# Patient Record
Sex: FEMALE | Race: WHITE | NOT HISPANIC OR LATINO | Employment: FULL TIME | URBAN - METROPOLITAN AREA
[De-identification: names, ages, dates, MRNs, and addresses within clinical notes are randomized per-mention and may not be internally consistent; named-entity substitution may affect disease eponyms.]

---

## 2021-01-04 ENCOUNTER — IMMUNIZATIONS (OUTPATIENT)
Dept: FAMILY MEDICINE CLINIC | Facility: HOSPITAL | Age: 61
End: 2021-01-04

## 2021-01-04 DIAGNOSIS — Z23 ENCOUNTER FOR IMMUNIZATION: ICD-10-CM

## 2021-01-04 PROCEDURE — 0011A SARS-COV-2 / COVID-19 MRNA VACCINE (MODERNA) 100 MCG: CPT

## 2021-01-04 PROCEDURE — 91301 SARS-COV-2 / COVID-19 MRNA VACCINE (MODERNA) 100 MCG: CPT

## 2021-02-03 ENCOUNTER — IMMUNIZATIONS (OUTPATIENT)
Dept: FAMILY MEDICINE CLINIC | Facility: HOSPITAL | Age: 61
End: 2021-02-03

## 2021-02-03 DIAGNOSIS — Z23 ENCOUNTER FOR IMMUNIZATION: Primary | ICD-10-CM

## 2021-02-03 PROCEDURE — 0012A SARS-COV-2 / COVID-19 MRNA VACCINE (MODERNA) 100 MCG: CPT

## 2021-02-03 PROCEDURE — 91301 SARS-COV-2 / COVID-19 MRNA VACCINE (MODERNA) 100 MCG: CPT

## 2021-04-12 ENCOUNTER — CONSULT (OUTPATIENT)
Dept: SURGERY | Facility: CLINIC | Age: 61
End: 2021-04-12
Payer: COMMERCIAL

## 2021-04-12 VITALS
HEIGHT: 67 IN | TEMPERATURE: 97.9 F | BODY MASS INDEX: 19.15 KG/M2 | SYSTOLIC BLOOD PRESSURE: 118 MMHG | DIASTOLIC BLOOD PRESSURE: 68 MMHG | HEART RATE: 79 BPM | WEIGHT: 122 LBS

## 2021-04-12 DIAGNOSIS — R10.11 RIGHT UPPER QUADRANT PAIN: Primary | ICD-10-CM

## 2021-04-12 PROCEDURE — 99203 OFFICE O/P NEW LOW 30 MIN: CPT | Performed by: SURGERY

## 2021-04-12 RX ORDER — MULTIVIT WITH MINERALS/LUTEIN
1000 TABLET ORAL DAILY
COMMUNITY

## 2021-04-12 NOTE — PROGRESS NOTES
[facility] History and Physical Note:    Assessment:   right upper quadrant pain  Plan: Will order gallbladder ultrasound and routine labs  See back in the clinic after these studies  Chief Complaint:  "I think I have  pain from my gallbladder"    HPI   patient is a very pleasant 78-year-old woman who made appointment to my office to be evaluated for her right upper quadrant pain  She reports epigastric and right upper quadrant pain intermittently for a little over 1 week  Pain is crampy, made worse with certain foods, located in the epigastric and right upper quadrant area, associated with nausea, and resolve spontaneously  Patient denies any vomiting or abnormal bowel movements  Denies any dark urine or light-colored stools  Her past medical history is unremarkable and she has no abdominal surgeries  PMH:  Past Medical History:   Diagnosis Date    Breast lump        PSH:  Past Surgical History:   Procedure Laterality Date    BREAST SURGERY         Home Meds:  Current Outpatient Medications on File Prior to Visit   Medication Sig Dispense Refill    Ascorbic Acid (vitamin C) 1000 MG tablet Take 1,000 mg by mouth daily       No current facility-administered medications on file prior to visit          Allergies:  No Known Allergies    Social Hx:  Social History     Socioeconomic History    Marital status: /Civil Union     Spouse name: Not on file    Number of children: Not on file    Years of education: Not on file    Highest education level: Not on file   Occupational History    Not on file   Social Needs    Financial resource strain: Not on file    Food insecurity     Worry: Not on file     Inability: Not on file   [employer] needs     Medical: Not on file     Non-medical: Not on file   Tobacco Use    Smoking status: Current Every Day Smoker     Types: Cigarettes    Smokeless tobacco: Never Used   Substance and Sexual Activity    Alcohol use: Yes     Comment: Socially    Drug use: Never    Sexual activity: Not on file   Lifestyle    Physical activity     Days per week: Not on file     Minutes per session: Not on file    Stress: Not on file   Relationships    Social connections     Talks on phone: Not on file     Gets together: Not on file     Attends Shinto service: Not on file     Active member of club or organization: Not on file     Attends meetings of clubs or organizations: Not on file     Relationship status: Not on file    Intimate partner violence     Fear of current or ex partner: Not on file     Emotionally abused: Not on file     Physically abused: Not on file     Forced sexual activity: Not on file   Other Topics Concern    Not on file   Social History Narrative    Not on file        Family Hx:    Family History   Problem Relation Age of Onset    Cancer Father          Review of Systems   Constitutional: Negative  HENT: Negative  Respiratory: Negative  Cardiovascular: Negative  Gastrointestinal:          See HPI   Genitourinary: Negative  Musculoskeletal: Negative  Allergic/Immunologic: Negative  Neurological: Negative  Hematological: Negative  /68 (BP Location: Right arm, Patient Position: Sitting, Cuff Size: Standard)   Pulse 79   Temp 97 9 °F (36 6 °C) (Core)   Ht 5' 7" (1 702 m)   Wt 55 3 kg (122 lb)   BMI 19 11 kg/m²     Physical Exam  Vitals signs reviewed  Constitutional:       General: She is not in acute distress  Appearance: Normal appearance  HENT:      Head: Normocephalic and atraumatic  Mouth/Throat:      Pharynx: Oropharynx is clear  Eyes:      Pupils: Pupils are equal, round, and reactive to light  Neck:      Musculoskeletal: Normal range of motion and neck supple  Cardiovascular:      Rate and Rhythm: Normal rate and regular rhythm  Heart sounds: No murmur  Pulmonary:      Effort: Pulmonary effort is normal  No respiratory distress  Breath sounds: Normal breath sounds  Abdominal:      General: Abdomen is flat  There is no distension  Palpations: Abdomen is soft  Comments:  Mild epigastric and right upper quadrant tenderness to deep palpation  No peritonitis   Musculoskeletal: Normal range of motion  Lymphadenopathy:      Cervical: No cervical adenopathy  Skin:     General: Skin is warm and dry  Capillary Refill: Capillary refill takes less than 2 seconds  Neurological:      General: No focal deficit present  Mental Status: She is alert and oriented to person, place, and time     Psychiatric:         Mood and Affect: Mood normal          Behavior: Behavior normal          Pertinent labs reviewed    Pertinent images and available reads personally reviewed    Pertinent notes reviewed       Christiano Schroeder MD [id number] [facility]  [phone]

## 2021-04-16 ENCOUNTER — HOSPITAL ENCOUNTER (OUTPATIENT)
Dept: RADIOLOGY | Facility: HOSPITAL | Age: 61
Discharge: HOME | End: 2021-04-16
Attending: SURGERY
Payer: COMMERCIAL

## 2021-04-16 ENCOUNTER — TRANSCRIBE ORDERS (OUTPATIENT)
Dept: ADMINISTRATIVE | Facility: HOSPITAL | Age: 61
End: 2021-04-16

## 2021-04-16 ENCOUNTER — APPOINTMENT (OUTPATIENT)
Dept: LAB | Facility: HOSPITAL | Age: 61
End: 2021-04-16
Attending: SURGERY
Payer: COMMERCIAL

## 2021-04-16 DIAGNOSIS — R10.11 RIGHT UPPER QUADRANT PAIN: ICD-10-CM

## 2021-04-16 LAB
ALBUMIN SERPL BCP-MCNC: 3.7 G/DL (ref 3.5–5)
ALP SERPL-CCNC: 67 U/L (ref 46–116)
ALT SERPL W P-5'-P-CCNC: 19 U/L (ref 12–78)
ANION GAP SERPL CALCULATED.3IONS-SCNC: 7 MMOL/L (ref 4–13)
AST SERPL W P-5'-P-CCNC: 10 U/L (ref 5–45)
BILIRUB SERPL-MCNC: 0.33 MG/DL (ref 0.2–1)
BUN SERPL-MCNC: 23 MG/DL (ref 5–25)
CALCIUM SERPL-MCNC: 8.6 MG/DL (ref 8.3–10.1)
CHLORIDE SERPL-SCNC: 107 MMOL/L (ref 100–108)
CO2 SERPL-SCNC: 28 MMOL/L (ref 21–32)
CREAT SERPL-MCNC: 0.64 MG/DL (ref 0.6–1.3)
ERYTHROCYTE [DISTWIDTH] IN BLOOD BY AUTOMATED COUNT: 13.1 % (ref 11.6–15.1)
GFR SERPL CREATININE-BSD FRML MDRD: 97 ML/MIN/1.73SQ M
GLUCOSE P FAST SERPL-MCNC: 81 MG/DL (ref 65–99)
HCT VFR BLD AUTO: 39.4 % (ref 34.8–46.1)
HGB BLD-MCNC: 12.3 G/DL (ref 11.5–15.4)
MCH RBC QN AUTO: 30.4 PG (ref 26.8–34.3)
MCHC RBC AUTO-ENTMCNC: 31.2 G/DL (ref 31.4–37.4)
MCV RBC AUTO: 98 FL (ref 82–98)
PLATELET # BLD AUTO: 311 THOUSANDS/UL (ref 149–390)
PMV BLD AUTO: 8.9 FL (ref 8.9–12.7)
POTASSIUM SERPL-SCNC: 4 MMOL/L (ref 3.5–5.3)
PROT SERPL-MCNC: 6.3 G/DL (ref 6.4–8.2)
RBC # BLD AUTO: 4.04 MILLION/UL (ref 3.81–5.12)
SODIUM SERPL-SCNC: 142 MMOL/L (ref 136–145)
WBC # BLD AUTO: 7.98 THOUSAND/UL (ref 4.31–10.16)

## 2021-04-16 PROCEDURE — 80053 COMPREHEN METABOLIC PANEL: CPT

## 2021-04-16 PROCEDURE — 36415 COLL VENOUS BLD VENIPUNCTURE: CPT

## 2021-04-16 PROCEDURE — 76705 ECHO EXAM OF ABDOMEN: CPT

## 2021-04-16 PROCEDURE — 85027 COMPLETE CBC AUTOMATED: CPT

## 2021-04-19 ENCOUNTER — OFFICE VISIT (OUTPATIENT)
Dept: SURGERY | Facility: CLINIC | Age: 61
End: 2021-04-19
Payer: COMMERCIAL

## 2021-04-19 VITALS
BODY MASS INDEX: 19.46 KG/M2 | HEART RATE: 72 BPM | SYSTOLIC BLOOD PRESSURE: 114 MMHG | TEMPERATURE: 97.9 F | HEIGHT: 67 IN | DIASTOLIC BLOOD PRESSURE: 74 MMHG | WEIGHT: 124 LBS

## 2021-04-19 DIAGNOSIS — Z01.818 PREOPERATIVE EXAMINATION: ICD-10-CM

## 2021-04-19 DIAGNOSIS — K80.20 SYMPTOMATIC CHOLELITHIASIS: Primary | ICD-10-CM

## 2021-04-19 PROCEDURE — 99213 OFFICE O/P EST LOW 20 MIN: CPT | Performed by: SURGERY

## 2021-04-19 PROCEDURE — 3008F BODY MASS INDEX DOCD: CPT | Performed by: SURGERY

## 2021-04-19 RX ORDER — CEFAZOLIN SODIUM 1 G/50ML
1000 SOLUTION INTRAVENOUS ONCE
Status: CANCELLED | OUTPATIENT
Start: 2021-04-28 | End: 2021-04-19

## 2021-04-19 NOTE — H&P (VIEW-ONLY)
[address] [place] [address] [facility] History and Physical Note:    Assessment:  Gallbladder sludge  Given her classic symptoms for gallbladder pathology, I have offered her laparoscopic cholecystectomy  I explained that some or all of her right upper quadrant symptoms could persist after surgery  She understands, desires to proceed  Plan:  Laparoscopic cholecystectomy  Chief Complaint:  "I am back to follow-up on the tests"    HPI  patient is a very pleasant 78-year-old woman who returns to my office for continued evaluation of her right upper quadrant pain        She reports epigastric and right upper quadrant pain intermittently for a little over 1 week prior to her last visit  Pain is crampy, made worse with certain foods, located in the epigastric and right upper quadrant area, associated with nausea, and resolve spontaneously  Patient denies any vomiting or abnormal bowel movements  Denies any dark urine or light-colored stools        Her past medical history is unremarkable and she has no abdominal surgeries  I ordered some routine laboratory work as well as a right upper quadrant ultrasound  CMP and CBC are unremarkable  Gallbladder ultrasound:   IMPRESSION:  Trace sludge in the gallbladder  No sonographic evidence for acute cholecystitis  PMH:  Past Medical History:   Diagnosis Date    Breast lump        PSH:  Past Surgical History:   Procedure Laterality Date    BREAST SURGERY         Home Meds:  Current Outpatient Medications on File Prior to Visit   Medication Sig Dispense Refill    Ascorbic Acid (vitamin C) 1000 MG tablet Take 1,000 mg by mouth daily       No current facility-administered medications on file prior to visit          Allergies:  No Known Allergies    Social Hx:  Social History     Socioeconomic History    Marital status: /Civil Union     Spouse name: Not on file    Number of children: Not on file    Years of education: Not on file   [place] Highest education level: Not on file   Occupational History    Not on file   Social Needs    Financial resource strain: Not on file    Food insecurity     Worry: Not on file     Inability: Not on file    Transportation needs     Medical: Not on file     Non-medical: Not on file   Tobacco Use    Smoking status: Current Every Day Smoker     Types: Cigarettes    Smokeless tobacco: Never Used   Substance and Sexual Activity    Alcohol use: Yes     Comment: Socially    Drug use: Never    Sexual activity: Not on file   Lifestyle    Physical activity     Days per week: Not on file     Minutes per session: Not on file    Stress: Not on file   Relationships    Social connections     Talks on phone: Not on file     Gets together: Not on file     Attends Pentecostalism service: Not on file     Active member of club or organization: Not on file     Attends meetings of clubs or organizations: Not on file     Relationship status: Not on file    Intimate partner violence     Fear of current or ex partner: Not on file     Emotionally abused: Not on file     Physically abused: Not on file     Forced sexual activity: Not on file   Other Topics Concern    Not on file   Social History Narrative    Not on file        Family Hx:    Family History   Problem Relation Age of Onset    Cancer Father          Review of Systems   Constitutional: Negative  HENT: Negative  Respiratory: Negative  Cardiovascular: Negative  Gastrointestinal: Negative  Genitourinary: Negative  Musculoskeletal: Negative  Skin: Negative  Allergic/Immunologic: Negative  Neurological: Negative  Hematological: Negative  Psychiatric/Behavioral: Negative  There were no vitals taken for this visit  Physical Exam  Constitutional:       General: She is not in acute distress  Appearance: Normal appearance  HENT:      Head: Normocephalic and atraumatic  Mouth/Throat:      Pharynx: Oropharynx is clear     Eyes: Pupils: Pupils are equal, round, and reactive to light  Neck:      Musculoskeletal: Normal range of motion and neck supple  Cardiovascular:      Rate and Rhythm: Normal rate and regular rhythm  Heart sounds: No murmur  Pulmonary:      Effort: No respiratory distress  Breath sounds: Normal breath sounds  Abdominal:      General: There is no distension  Palpations: Abdomen is soft  There is no mass  Comments:  Mild discomfort in the right upper quadrant to deep palpation  Musculoskeletal: Normal range of motion  Lymphadenopathy:      Cervical: No cervical adenopathy  Skin:     General: Skin is warm and dry  Capillary Refill: Capillary refill takes less than 2 seconds  Neurological:      General: No focal deficit present  Mental Status: She is alert and oriented to person, place, and time  Pertinent labs reviewed  I personally reviewed her CBC and CMP    Pertinent images and available reads personally reviewed   I personally reviewed a gallbladder ultrasound as well as the report and reviewed this with the patient      Pertinent notes reviewed       Lei Lopez MD [address] [facility]  [phone]

## 2021-04-19 NOTE — PROGRESS NOTES
[address] [place] [address] [facility] History and Physical Note:    Assessment:  Gallbladder sludge  Given her classic symptoms for gallbladder pathology, I have offered her laparoscopic cholecystectomy  I explained that some or all of her right upper quadrant symptoms could persist after surgery  She understands, desires to proceed  Plan:  Laparoscopic cholecystectomy  Chief Complaint:  "I am back to follow-up on the tests"    HPI  patient is a very pleasant 58-year-old woman who returns to my office for continued evaluation of her right upper quadrant pain        She reports epigastric and right upper quadrant pain intermittently for a little over 1 week prior to her last visit  Pain is crampy, made worse with certain foods, located in the epigastric and right upper quadrant area, associated with nausea, and resolve spontaneously  Patient denies any vomiting or abnormal bowel movements  Denies any dark urine or light-colored stools        Her past medical history is unremarkable and she has no abdominal surgeries  I ordered some routine laboratory work as well as a right upper quadrant ultrasound  CMP and CBC are unremarkable  Gallbladder ultrasound:   IMPRESSION:  Trace sludge in the gallbladder  No sonographic evidence for acute cholecystitis  PMH:  Past Medical History:   Diagnosis Date    Breast lump        PSH:  Past Surgical History:   Procedure Laterality Date    BREAST SURGERY         Home Meds:  Current Outpatient Medications on File Prior to Visit   Medication Sig Dispense Refill    Ascorbic Acid (vitamin C) 1000 MG tablet Take 1,000 mg by mouth daily       No current facility-administered medications on file prior to visit          Allergies:  No Known Allergies    Social Hx:  Social History     Socioeconomic History    Marital status: /Civil Union     Spouse name: Not on file    Number of children: Not on file    Years of education: Not on file   Destiney Navas Highest education level: Not on file   Occupational History    Not on file   Social Needs    Financial resource strain: Not on file    Food insecurity     Worry: Not on file     Inability: Not on file    Transportation needs     Medical: Not on file     Non-medical: Not on file   Tobacco Use    Smoking status: Current Every Day Smoker     Types: Cigarettes    Smokeless tobacco: Never Used   Substance and Sexual Activity    Alcohol use: Yes     Comment: Socially    Drug use: Never    Sexual activity: Not on file   Lifestyle    Physical activity     Days per week: Not on file     Minutes per session: Not on file    Stress: Not on file   Relationships    Social connections     Talks on phone: Not on file     Gets together: Not on file     Attends Restorationist service: Not on file     Active member of club or organization: Not on file     Attends meetings of clubs or organizations: Not on file     Relationship status: Not on file    Intimate partner violence     Fear of current or ex partner: Not on file     Emotionally abused: Not on file     Physically abused: Not on file     Forced sexual activity: Not on file   Other Topics Concern    Not on file   Social History Narrative    Not on file        Family Hx:    Family History   Problem Relation Age of Onset    Cancer Father          Review of Systems   Constitutional: Negative  HENT: Negative  Respiratory: Negative  Cardiovascular: Negative  Gastrointestinal: Negative  Genitourinary: Negative  Musculoskeletal: Negative  Skin: Negative  Allergic/Immunologic: Negative  Neurological: Negative  Hematological: Negative  Psychiatric/Behavioral: Negative  There were no vitals taken for this visit  Physical Exam  Constitutional:       General: She is not in acute distress  Appearance: Normal appearance  HENT:      Head: Normocephalic and atraumatic  Mouth/Throat:      Pharynx: Oropharynx is clear     Eyes: Pupils: Pupils are equal, round, and reactive to light  Neck:      Musculoskeletal: Normal range of motion and neck supple  Cardiovascular:      Rate and Rhythm: Normal rate and regular rhythm  Heart sounds: No murmur  Pulmonary:      Effort: No respiratory distress  Breath sounds: Normal breath sounds  Abdominal:      General: There is no distension  Palpations: Abdomen is soft  There is no mass  Comments:  Mild discomfort in the right upper quadrant to deep palpation  Musculoskeletal: Normal range of motion  Lymphadenopathy:      Cervical: No cervical adenopathy  Skin:     General: Skin is warm and dry  Capillary Refill: Capillary refill takes less than 2 seconds  Neurological:      General: No focal deficit present  Mental Status: She is alert and oriented to person, place, and time  Pertinent labs reviewed  I personally reviewed her CBC and CMP    Pertinent images and available reads personally reviewed   I personally reviewed a gallbladder ultrasound as well as the report and reviewed this with the patient      Pertinent notes reviewed       Cinthya Figueroa MD [address] [facility]  [phone]

## 2021-04-19 NOTE — H&P
[address] [place] [address] [facility] History and Physical Note:    Assessment:  Gallbladder sludge  Given her classic symptoms for gallbladder pathology, I have offered her laparoscopic cholecystectomy  I explained that some or all of her right upper quadrant symptoms could persist after surgery  She understands, desires to proceed  Plan:  Laparoscopic cholecystectomy  Chief Complaint:  "I am back to follow-up on the tests"    HPI  patient is a very pleasant 69-year-old woman who returns to my office for continued evaluation of her right upper quadrant pain        She reports epigastric and right upper quadrant pain intermittently for a little over 1 week prior to her last visit  Pain is crampy, made worse with certain foods, located in the epigastric and right upper quadrant area, associated with nausea, and resolve spontaneously  Patient denies any vomiting or abnormal bowel movements  Denies any dark urine or light-colored stools        Her past medical history is unremarkable and she has no abdominal surgeries  I ordered some routine laboratory work as well as a right upper quadrant ultrasound  CMP and CBC are unremarkable  Gallbladder ultrasound:   IMPRESSION:  Trace sludge in the gallbladder  No sonographic evidence for acute cholecystitis  PMH:  Past Medical History:   Diagnosis Date    Breast lump        PSH:  Past Surgical History:   Procedure Laterality Date    BREAST SURGERY         Home Meds:  Current Outpatient Medications on File Prior to Visit   Medication Sig Dispense Refill    Ascorbic Acid (vitamin C) 1000 MG tablet Take 1,000 mg by mouth daily       No current facility-administered medications on file prior to visit          Allergies:  No Known Allergies    Social Hx:  Social History     Socioeconomic History    Marital status: /Civil Union     Spouse name: Not on file    Number of children: Not on file    Years of education: Not on file   [place] Highest education level: Not on file   Occupational History    Not on file   Social Needs    Financial resource strain: Not on file    Food insecurity     Worry: Not on file     Inability: Not on file    Transportation needs     Medical: Not on file     Non-medical: Not on file   Tobacco Use    Smoking status: Current Every Day Smoker     Types: Cigarettes    Smokeless tobacco: Never Used   Substance and Sexual Activity    Alcohol use: Yes     Comment: Socially    Drug use: Never    Sexual activity: Not on file   Lifestyle    Physical activity     Days per week: Not on file     Minutes per session: Not on file    Stress: Not on file   Relationships    Social connections     Talks on phone: Not on file     Gets together: Not on file     Attends Lutheran service: Not on file     Active member of club or organization: Not on file     Attends meetings of clubs or organizations: Not on file     Relationship status: Not on file    Intimate partner violence     Fear of current or ex partner: Not on file     Emotionally abused: Not on file     Physically abused: Not on file     Forced sexual activity: Not on file   Other Topics Concern    Not on file   Social History Narrative    Not on file        Family Hx:    Family History   Problem Relation Age of Onset    Cancer Father          Review of Systems   Constitutional: Negative  HENT: Negative  Respiratory: Negative  Cardiovascular: Negative  Gastrointestinal: Negative  Genitourinary: Negative  Musculoskeletal: Negative  Skin: Negative  Allergic/Immunologic: Negative  Neurological: Negative  Hematological: Negative  Psychiatric/Behavioral: Negative  There were no vitals taken for this visit  Physical Exam  Constitutional:       General: She is not in acute distress  Appearance: Normal appearance  HENT:      Head: Normocephalic and atraumatic  Mouth/Throat:      Pharynx: Oropharynx is clear     Eyes: Pupils: Pupils are equal, round, and reactive to light  Neck:      Musculoskeletal: Normal range of motion and neck supple  Cardiovascular:      Rate and Rhythm: Normal rate and regular rhythm  Heart sounds: No murmur  Pulmonary:      Effort: No respiratory distress  Breath sounds: Normal breath sounds  Abdominal:      General: There is no distension  Palpations: Abdomen is soft  There is no mass  Comments:  Mild discomfort in the right upper quadrant to deep palpation  Musculoskeletal: Normal range of motion  Lymphadenopathy:      Cervical: No cervical adenopathy  Skin:     General: Skin is warm and dry  Capillary Refill: Capillary refill takes less than 2 seconds  Neurological:      General: No focal deficit present  Mental Status: She is alert and oriented to person, place, and time  Pertinent labs reviewed  I personally reviewed her CBC and CMP    Pertinent images and available reads personally reviewed   I personally reviewed a gallbladder ultrasound as well as the report and reviewed this with the patient      Pertinent notes reviewed       Ivy Brandon MD [id number] [facility]  [phone]

## 2021-04-22 DIAGNOSIS — Z01.818 PREOPERATIVE EXAMINATION: ICD-10-CM

## 2021-04-22 PROCEDURE — U0005 INFEC AGEN DETEC AMPLI PROBE: HCPCS | Performed by: SURGERY

## 2021-04-22 PROCEDURE — U0003 INFECTIOUS AGENT DETECTION BY NUCLEIC ACID (DNA OR RNA); SEVERE ACUTE RESPIRATORY SYNDROME CORONAVIRUS 2 (SARS-COV-2) (CORONAVIRUS DISEASE [COVID-19]), AMPLIFIED PROBE TECHNIQUE, MAKING USE OF HIGH THROUGHPUT TECHNOLOGIES AS DESCRIBED BY CMS-2020-01-R: HCPCS | Performed by: SURGERY

## 2021-04-23 ENCOUNTER — TRANSCRIBE ORDERS (OUTPATIENT)
Dept: ADMINISTRATIVE | Facility: HOSPITAL | Age: 61
End: 2021-04-23

## 2021-04-23 ENCOUNTER — OFFICE VISIT (OUTPATIENT)
Dept: LAB | Facility: HOSPITAL | Age: 61
End: 2021-04-23
Attending: SURGERY
Payer: COMMERCIAL

## 2021-04-23 DIAGNOSIS — Z01.818 PREOPERATIVE EXAMINATION: ICD-10-CM

## 2021-04-23 LAB — SARS-COV-2 RNA RESP QL NAA+PROBE: NEGATIVE

## 2021-04-23 PROCEDURE — 93005 ELECTROCARDIOGRAM TRACING: CPT

## 2021-04-24 LAB
ATRIAL RATE: 60 BPM
P AXIS: 60 DEGREES
PR INTERVAL: 134 MS
QRS AXIS: -7 DEGREES
QRSD INTERVAL: 92 MS
QT INTERVAL: 382 MS
QTC INTERVAL: 382 MS
T WAVE AXIS: 6 DEGREES
VENTRICULAR RATE: 60 BPM

## 2021-04-24 PROCEDURE — 93010 ELECTROCARDIOGRAM REPORT: CPT | Performed by: INTERNAL MEDICINE

## 2021-04-27 ENCOUNTER — ANESTHESIA EVENT (OUTPATIENT)
Dept: PERIOP | Facility: HOSPITAL | Age: 61
End: 2021-04-27
Payer: COMMERCIAL

## 2021-04-27 PROBLEM — F17.200 SMOKING: Status: ACTIVE | Noted: 2021-04-27

## 2021-04-27 PROBLEM — IMO0001 SMOKING: Status: ACTIVE | Noted: 2021-04-27

## 2021-04-27 NOTE — ANESTHESIA PREPROCEDURE EVALUATION
Procedure:  CHOLECYSTECTOMY LAPAROSCOPIC (N/A Abdomen)    Relevant Problems   PULMONARY   (+) Smoking        Physical Exam    Airway    Mallampati score: II  TM Distance: >3 FB  Neck ROM: full     Dental       Cardiovascular  Rhythm: regular, Rate: normal,     Pulmonary  Breath sounds clear to auscultation,     Other Findings  Upper lip lesion from skin cancer resection      Anesthesia Plan  ASA Score- 2     Anesthesia Type- general with ASA Monitors  Additional Monitors:   Airway Plan: ETT  Plan Factors-    Chart reviewed  Patient is a current smoker  Induction- intravenous  Postoperative Plan- Plan for postoperative opioid use  Informed Consent- Anesthetic plan and risks discussed with patient  I personally reviewed this patient with the CRNA  Discussed and agreed on the Anesthesia Plan with the CRNA  Harvey Brizuela

## 2021-04-28 ENCOUNTER — ANESTHESIA (OUTPATIENT)
Dept: PERIOP | Facility: HOSPITAL | Age: 61
End: 2021-04-28
Payer: COMMERCIAL

## 2021-04-28 ENCOUNTER — HOSPITAL ENCOUNTER (OUTPATIENT)
Facility: HOSPITAL | Age: 61
Setting detail: OUTPATIENT SURGERY
Discharge: HOME | End: 2021-04-28
Attending: SURGERY | Admitting: SURGERY
Payer: COMMERCIAL

## 2021-04-28 VITALS
BODY MASS INDEX: 19.37 KG/M2 | DIASTOLIC BLOOD PRESSURE: 81 MMHG | OXYGEN SATURATION: 98 % | TEMPERATURE: 97.3 F | HEART RATE: 86 BPM | HEIGHT: 67 IN | WEIGHT: 123.4 LBS | SYSTOLIC BLOOD PRESSURE: 126 MMHG | RESPIRATION RATE: 16 BRPM

## 2021-04-28 DIAGNOSIS — G89.18 POSTOPERATIVE PAIN: Primary | ICD-10-CM

## 2021-04-28 DIAGNOSIS — K80.20 SYMPTOMATIC CHOLELITHIASIS: ICD-10-CM

## 2021-04-28 PROCEDURE — 88304 TISSUE EXAM BY PATHOLOGIST: CPT | Performed by: PATHOLOGY

## 2021-04-28 PROCEDURE — 47562 LAPAROSCOPIC CHOLECYSTECTOMY: CPT | Performed by: PHYSICIAN ASSISTANT

## 2021-04-28 PROCEDURE — 47562 LAPAROSCOPIC CHOLECYSTECTOMY: CPT | Performed by: SURGERY

## 2021-04-28 RX ORDER — PROPOFOL 10 MG/ML
INJECTION, EMULSION INTRAVENOUS AS NEEDED
Status: DISCONTINUED | OUTPATIENT
Start: 2021-04-28 | End: 2021-04-28

## 2021-04-28 RX ORDER — FENTANYL CITRATE/PF 50 MCG/ML
25 SYRINGE (ML) INJECTION
Status: COMPLETED | OUTPATIENT
Start: 2021-04-28 | End: 2021-04-28

## 2021-04-28 RX ORDER — MAGNESIUM HYDROXIDE 1200 MG/15ML
LIQUID ORAL AS NEEDED
Status: DISCONTINUED | OUTPATIENT
Start: 2021-04-28 | End: 2021-04-28 | Stop reason: HOSPADM

## 2021-04-28 RX ORDER — GLYCOPYRROLATE 0.2 MG/ML
INJECTION INTRAMUSCULAR; INTRAVENOUS AS NEEDED
Status: DISCONTINUED | OUTPATIENT
Start: 2021-04-28 | End: 2021-04-28

## 2021-04-28 RX ORDER — ROCURONIUM BROMIDE 10 MG/ML
INJECTION, SOLUTION INTRAVENOUS AS NEEDED
Status: DISCONTINUED | OUTPATIENT
Start: 2021-04-28 | End: 2021-04-28

## 2021-04-28 RX ORDER — OXYCODONE HYDROCHLORIDE AND ACETAMINOPHEN 5; 325 MG/1; MG/1
1 TABLET ORAL EVERY 4 HOURS PRN
Qty: 8 TABLET | Refills: 0 | Status: SHIPPED | OUTPATIENT
Start: 2021-04-28

## 2021-04-28 RX ORDER — CEFAZOLIN SODIUM 1 G/50ML
SOLUTION INTRAVENOUS AS NEEDED
Status: DISCONTINUED | OUTPATIENT
Start: 2021-04-28 | End: 2021-04-28

## 2021-04-28 RX ORDER — BUPIVACAINE HYDROCHLORIDE AND EPINEPHRINE 5; 5 MG/ML; UG/ML
INJECTION, SOLUTION EPIDURAL; INTRACAUDAL; PERINEURAL AS NEEDED
Status: DISCONTINUED | OUTPATIENT
Start: 2021-04-28 | End: 2021-04-28 | Stop reason: HOSPADM

## 2021-04-28 RX ORDER — ONDANSETRON 2 MG/ML
INJECTION INTRAMUSCULAR; INTRAVENOUS AS NEEDED
Status: DISCONTINUED | OUTPATIENT
Start: 2021-04-28 | End: 2021-04-28

## 2021-04-28 RX ORDER — SODIUM CHLORIDE, SODIUM LACTATE, POTASSIUM CHLORIDE, CALCIUM CHLORIDE 600; 310; 30; 20 MG/100ML; MG/100ML; MG/100ML; MG/100ML
20 INJECTION, SOLUTION INTRAVENOUS CONTINUOUS
Status: DISCONTINUED | OUTPATIENT
Start: 2021-04-28 | End: 2021-04-28 | Stop reason: HOSPADM

## 2021-04-28 RX ORDER — FENTANYL CITRATE 50 UG/ML
INJECTION, SOLUTION INTRAMUSCULAR; INTRAVENOUS AS NEEDED
Status: DISCONTINUED | OUTPATIENT
Start: 2021-04-28 | End: 2021-04-28

## 2021-04-28 RX ORDER — CEFAZOLIN SODIUM 1 G/50ML
1000 SOLUTION INTRAVENOUS ONCE
Status: DISCONTINUED | OUTPATIENT
Start: 2021-04-28 | End: 2021-04-28 | Stop reason: HOSPADM

## 2021-04-28 RX ORDER — NEOSTIGMINE METHYLSULFATE 1 MG/ML
INJECTION INTRAVENOUS AS NEEDED
Status: DISCONTINUED | OUTPATIENT
Start: 2021-04-28 | End: 2021-04-28

## 2021-04-28 RX ORDER — METOCLOPRAMIDE HYDROCHLORIDE 5 MG/ML
10 INJECTION INTRAMUSCULAR; INTRAVENOUS ONCE AS NEEDED
Status: DISCONTINUED | OUTPATIENT
Start: 2021-04-28 | End: 2021-04-28 | Stop reason: HOSPADM

## 2021-04-28 RX ORDER — ONDANSETRON 2 MG/ML
4 INJECTION INTRAMUSCULAR; INTRAVENOUS ONCE AS NEEDED
Status: DISCONTINUED | OUTPATIENT
Start: 2021-04-28 | End: 2021-04-28 | Stop reason: HOSPADM

## 2021-04-28 RX ORDER — LIDOCAINE HYDROCHLORIDE 10 MG/ML
INJECTION, SOLUTION EPIDURAL; INFILTRATION; INTRACAUDAL; PERINEURAL AS NEEDED
Status: DISCONTINUED | OUTPATIENT
Start: 2021-04-28 | End: 2021-04-28

## 2021-04-28 RX ORDER — MIDAZOLAM HYDROCHLORIDE 2 MG/2ML
INJECTION, SOLUTION INTRAMUSCULAR; INTRAVENOUS AS NEEDED
Status: DISCONTINUED | OUTPATIENT
Start: 2021-04-28 | End: 2021-04-28

## 2021-04-28 RX ORDER — SODIUM CHLORIDE, SODIUM LACTATE, POTASSIUM CHLORIDE, CALCIUM CHLORIDE 600; 310; 30; 20 MG/100ML; MG/100ML; MG/100ML; MG/100ML
75 INJECTION, SOLUTION INTRAVENOUS CONTINUOUS
Status: DISCONTINUED | OUTPATIENT
Start: 2021-04-28 | End: 2021-04-28 | Stop reason: SDUPTHER

## 2021-04-28 RX ORDER — DEXAMETHASONE SODIUM PHOSPHATE 4 MG/ML
INJECTION, SOLUTION INTRA-ARTICULAR; INTRALESIONAL; INTRAMUSCULAR; INTRAVENOUS; SOFT TISSUE AS NEEDED
Status: DISCONTINUED | OUTPATIENT
Start: 2021-04-28 | End: 2021-04-28

## 2021-04-28 RX ADMIN — ONDANSETRON 4 MG: 2 INJECTION INTRAMUSCULAR; INTRAVENOUS at 09:55

## 2021-04-28 RX ADMIN — FENTANYL CITRATE 25 MCG: 50 INJECTION, SOLUTION INTRAMUSCULAR; INTRAVENOUS at 11:22

## 2021-04-28 RX ADMIN — PROPOFOL 200 MG: 10 INJECTION, EMULSION INTRAVENOUS at 09:43

## 2021-04-28 RX ADMIN — NEOSTIGMINE METHYLSULFATE 3 MG: 1 INJECTION INTRAVENOUS at 10:21

## 2021-04-28 RX ADMIN — LIDOCAINE HYDROCHLORIDE 50 MG: 10 INJECTION, SOLUTION EPIDURAL; INFILTRATION; INTRACAUDAL; PERINEURAL at 09:43

## 2021-04-28 RX ADMIN — GLYCOPYRROLATE 0.4 MG: 0.2 INJECTION, SOLUTION INTRAMUSCULAR; INTRAVENOUS at 10:21

## 2021-04-28 RX ADMIN — FENTANYL CITRATE 50 MCG: 50 INJECTION, SOLUTION INTRAMUSCULAR; INTRAVENOUS at 10:07

## 2021-04-28 RX ADMIN — MIDAZOLAM 2 MG: 1 INJECTION INTRAMUSCULAR; INTRAVENOUS at 09:36

## 2021-04-28 RX ADMIN — CEFAZOLIN SODIUM 1000 MG: 1 SOLUTION INTRAVENOUS at 09:33

## 2021-04-28 RX ADMIN — FENTANYL CITRATE 50 MCG: 50 INJECTION, SOLUTION INTRAMUSCULAR; INTRAVENOUS at 09:43

## 2021-04-28 RX ADMIN — SODIUM CHLORIDE, SODIUM LACTATE, POTASSIUM CHLORIDE, AND CALCIUM CHLORIDE 75 ML/HR: .6; .31; .03; .02 INJECTION, SOLUTION INTRAVENOUS at 07:30

## 2021-04-28 RX ADMIN — ROCURONIUM BROMIDE 40 MG: 10 INJECTION, SOLUTION INTRAVENOUS at 09:43

## 2021-04-28 RX ADMIN — ROCURONIUM BROMIDE 10 MG: 10 INJECTION, SOLUTION INTRAVENOUS at 10:06

## 2021-04-28 RX ADMIN — FENTANYL CITRATE 25 MCG: 50 INJECTION, SOLUTION INTRAMUSCULAR; INTRAVENOUS at 10:55

## 2021-04-28 RX ADMIN — DEXAMETHASONE SODIUM PHOSPHATE 8 MG: 4 INJECTION, SOLUTION INTRA-ARTICULAR; INTRALESIONAL; INTRAMUSCULAR; INTRAVENOUS; SOFT TISSUE at 09:55

## 2021-04-28 RX ADMIN — FENTANYL CITRATE 25 MCG: 50 INJECTION, SOLUTION INTRAMUSCULAR; INTRAVENOUS at 11:04

## 2021-04-28 RX ADMIN — FENTANYL CITRATE 100 MCG: 50 INJECTION, SOLUTION INTRAMUSCULAR; INTRAVENOUS at 11:11

## 2021-04-28 NOTE — PERIOPERATIVE NURSING NOTE
Received patient to APU room 5, patient is alert and awake, VSS, no distress noted, surgical site in abd intact, no bleeding noted, patient tolerating PO fluids, call bell placed in reach, will continue to monitor patient

## 2021-04-28 NOTE — DISCHARGE INSTR - AVS FIRST PAGE
1  Keep incisions clean and dry for 2 days  After 2 days, they may get wet in the shower  No dressings are required  2  Take the Tylenol that you have at home, 2 pills, 3 times a day regularly  3  Take the Percocet, in addition to the Tylenol, if you need further pain control    4  If you do not already have an appointment, call my office at [phone] for appointment to be seen in about 10-14 days

## 2021-04-28 NOTE — PERIOPERATIVE NURSING NOTE
Post-Op processes and procedures were explained and all related patient and family questions were answered  Discharge instructions were given and all related patient and family questions were answered  Pt d/c to home at this time w/ Tejinder Curry  Via: Wheelchair  Pt left with all belongings  Iv was D/C intact with dry sterile dressing  Encouraged to keep follow up appointments, Verbalized understanding  D/C instructions reviewed and explained  Verbalized understanding  New Rx given and explained  Verbalized understanding

## 2021-04-28 NOTE — ANESTHESIA POSTPROCEDURE EVALUATION
Post-Op Assessment Note    CV Status:  Stable  Pain Score: 0    Pain management: adequate     Mental Status:  Sleepy   Hydration Status:  Stable   PONV Controlled:  None   Airway Patency:  Patent   Two or more mitigation strategies used for obstructive sleep apnea   Post Op Vitals Reviewed: Yes      Staff: CRNA         No complications documented      BP   120/63   Temp 97   Pulse 82   Resp 16   SpO2 100

## 2021-04-28 NOTE — OP NOTE
OPERATIVE REPORT  PATIENT NAME: Teri Hadley    :  1960  MRN: [id number]  Pt Location: [facility] OR ROOM 01    SURGERY DATE: 2021    Surgeon(s) and Role:     * Hilario Alba MD - Primary     * Claudia Jeffery PA-C - Assisting    Preop Diagnosis:  Symptomatic cholelithiasis [K80 20]    Post-Op Diagnosis Codes: * Symptomatic cholelithiasis [K80 20]    Procedure(s) (LRB):  CHOLECYSTECTOMY LAPAROSCOPIC (N/A)    Specimen(s):  ID Type Source Tests Collected by Time Destination   1 : gallbladder Tissue Gallbladder TISSUE EXAM Hilario Alba MD 2021 1008        Estimated Blood Loss:   5 mL    Drains:  * No LDAs found *    Anesthesia Type:   General    Operative Indications:  Symptomatic cholelithiasis [K80 20]      Operative Findings:  Normal appearing gallbladder with stones    Complications:   None    Procedure and Technique:  Laparoscopic cholecystectomy  Patient was taken back to main operating room, placed supine on the operating table, general anesthesia was induced, the abdomen was prepped and draped in normal fashion  Utilizing the Veress needle at the umbilicus, a pneumoperitoneum was created to 15 mmHg and maintained at this level throughout the case  An 11 mm trocar was placed at the umbilicus  Three additional 5 mm trocars were placed in the upper abdomen  Utilizing standard laparoscopic technique, a normal appearing gallbladder with a few filmy adhesions was clearly identified  These adhesions were removed with Bovie cautery  Dissection around the infundibulum revealed the cystic duct and cystic artery  After the critical view was obtained, these structures were clipped and divided  The gallbladder was removed from the gallbladder bed fossa with Bovie cautery, placed in the Endo-Catch bag, and removed from the umbilical port  During this process, a small amount of bile was spilled  This was irrigated and suction      The fascial opening at the umbilicus was closed with 0 Vicryl suture  Four 0 Monocryl was used to approximate the skin edges  Exofin was placed as a dressing  The patient awoke from general anesthesia, was extubated in the operating room, sent to the PACU in stable condition      BROCK Batista was required for technical assistance and retraction   I was present for the entire procedure and A qualified resident physician was not available    Patient Disposition:  PACU     SIGNATURE: Chirag Hathaway MD  DATE: April 28, 2021  TIME: 10:24 AM

## 2021-04-28 NOTE — INTERVAL H&P NOTE
H&P reviewed  After examining the patient I find no changes in the patients condition since the H&P had been written      Vitals:    04/28/21 0720   BP: 127/82   Pulse: 91   Resp: 16   Temp: 98 4 °F (36 9 °C)   SpO2: 98%

## 2021-05-10 ENCOUNTER — OFFICE VISIT (OUTPATIENT)
Dept: SURGERY | Facility: CLINIC | Age: 61
End: 2021-05-10

## 2021-05-10 VITALS
WEIGHT: 120.8 LBS | SYSTOLIC BLOOD PRESSURE: 108 MMHG | HEIGHT: 67 IN | TEMPERATURE: 97.8 F | DIASTOLIC BLOOD PRESSURE: 60 MMHG | HEART RATE: 74 BPM | BODY MASS INDEX: 18.96 KG/M2

## 2021-05-10 DIAGNOSIS — Z09 POSTOPERATIVE EXAMINATION: Primary | ICD-10-CM

## 2021-05-10 PROCEDURE — 3008F BODY MASS INDEX DOCD: CPT | Performed by: SURGERY

## 2021-05-10 PROCEDURE — 99024 POSTOP FOLLOW-UP VISIT: CPT | Performed by: SURGERY

## 2021-05-10 NOTE — PROGRESS NOTES
Patient is status post laparoscopic cholecystectomy 28 April for symptomatic cholelithiasis  Patient is tolerating a regular diet with normal bowel function  Minimal pain complaints and no wound issues  Pathology report:  Final Diagnosis   A  Gallbladder, cholecystectomy:  - Chronic cholecystitis, adenomyomatosis and cholelithiasis  Incisions healing well  No signs of infection  Patient counseled  Follow-up p r n

## 2021-11-15 ENCOUNTER — OFFICE VISIT (OUTPATIENT)
Dept: URGENT CARE | Facility: CLINIC | Age: 61
End: 2021-11-15
Payer: COMMERCIAL

## 2021-11-15 VITALS
DIASTOLIC BLOOD PRESSURE: 75 MMHG | BODY MASS INDEX: 18.68 KG/M2 | HEIGHT: 67 IN | TEMPERATURE: 97.5 F | OXYGEN SATURATION: 95 % | HEART RATE: 76 BPM | SYSTOLIC BLOOD PRESSURE: 124 MMHG | RESPIRATION RATE: 18 BRPM | WEIGHT: 119 LBS

## 2021-11-15 DIAGNOSIS — B34.9 VIRAL SYNDROME: Primary | ICD-10-CM

## 2021-11-15 PROCEDURE — U0003 INFECTIOUS AGENT DETECTION BY NUCLEIC ACID (DNA OR RNA); SEVERE ACUTE RESPIRATORY SYNDROME CORONAVIRUS 2 (SARS-COV-2) (CORONAVIRUS DISEASE [COVID-19]), AMPLIFIED PROBE TECHNIQUE, MAKING USE OF HIGH THROUGHPUT TECHNOLOGIES AS DESCRIBED BY CMS-2020-01-R: HCPCS | Performed by: PHYSICIAN ASSISTANT

## 2021-11-15 PROCEDURE — 99203 OFFICE O/P NEW LOW 30 MIN: CPT | Performed by: PHYSICIAN ASSISTANT

## 2021-11-15 PROCEDURE — U0005 INFEC AGEN DETEC AMPLI PROBE: HCPCS | Performed by: PHYSICIAN ASSISTANT

## 2021-11-15 RX ORDER — AZITHROMYCIN 250 MG/1
TABLET, FILM COATED ORAL
Qty: 6 TABLET | Refills: 0 | Status: SHIPPED | OUTPATIENT
Start: 2021-11-15 | End: 2021-11-19

## 2021-11-16 LAB — SARS-COV-2 RNA RESP QL NAA+PROBE: NEGATIVE

## 2021-12-27 ENCOUNTER — IMMUNIZATIONS (OUTPATIENT)
Dept: FAMILY MEDICINE CLINIC | Facility: HOSPITAL | Age: 61
End: 2021-12-27

## 2021-12-27 DIAGNOSIS — Z23 ENCOUNTER FOR IMMUNIZATION: Primary | ICD-10-CM

## 2021-12-27 PROCEDURE — 91306 COVID-19 MODERNA VACC 0.25 ML BOOSTER: CPT

## 2021-12-27 PROCEDURE — 0064A COVID-19 MODERNA VACC 0.25 ML BOOSTER: CPT

## 2024-03-15 ENCOUNTER — ANESTHESIA EVENT (OUTPATIENT)
Dept: PERIOP | Facility: HOSPITAL | Age: 64
End: 2024-03-15
Payer: COMMERCIAL

## 2024-03-15 ENCOUNTER — HOSPITAL ENCOUNTER (OUTPATIENT)
Facility: HOSPITAL | Age: 64
Setting detail: OBSERVATION
Discharge: HOME | End: 2024-03-16
Attending: EMERGENCY MEDICINE | Admitting: INTERNAL MEDICINE
Payer: COMMERCIAL

## 2024-03-15 ENCOUNTER — APPOINTMENT (OUTPATIENT)
Dept: RADIOLOGY | Facility: HOSPITAL | Age: 64
End: 2024-03-15
Payer: COMMERCIAL

## 2024-03-15 ENCOUNTER — ANESTHESIA (OUTPATIENT)
Dept: PERIOP | Facility: HOSPITAL | Age: 64
End: 2024-03-15
Payer: COMMERCIAL

## 2024-03-15 ENCOUNTER — APPOINTMENT (EMERGENCY)
Dept: RADIOLOGY | Facility: HOSPITAL | Age: 64
End: 2024-03-15
Payer: COMMERCIAL

## 2024-03-15 DIAGNOSIS — N20.1 URETERAL CALCULUS: ICD-10-CM

## 2024-03-15 DIAGNOSIS — N39.0 URINARY TRACT INFECTION WITHOUT HEMATURIA, SITE UNSPECIFIED: ICD-10-CM

## 2024-03-15 DIAGNOSIS — N20.1 URETERAL STONE: ICD-10-CM

## 2024-03-15 DIAGNOSIS — N12 PYELONEPHRITIS: ICD-10-CM

## 2024-03-15 DIAGNOSIS — N13.2 URETERAL STONE WITH HYDRONEPHROSIS: Primary | ICD-10-CM

## 2024-03-15 PROBLEM — E83.42 HYPOMAGNESEMIA: Status: ACTIVE | Noted: 2024-03-15

## 2024-03-15 LAB
ALBUMIN SERPL BCP-MCNC: 4.3 G/DL (ref 3.5–5)
ALP SERPL-CCNC: 69 U/L (ref 34–104)
ALT SERPL W P-5'-P-CCNC: 9 U/L (ref 7–52)
ANION GAP SERPL CALCULATED.3IONS-SCNC: -5 MMOL/L (ref 4–13)
AST SERPL W P-5'-P-CCNC: 13 U/L (ref 13–39)
BACTERIA UR QL AUTO: ABNORMAL /HPF
BASOPHILS # BLD AUTO: 0.04 THOUSANDS/ÂΜL (ref 0–0.1)
BASOPHILS NFR BLD AUTO: 0 % (ref 0–1)
BILIRUB SERPL-MCNC: 0.75 MG/DL (ref 0.2–1)
BILIRUB UR QL STRIP: ABNORMAL
BUN SERPL-MCNC: 20 MG/DL (ref 5–25)
CALCIUM SERPL-MCNC: 9.2 MG/DL (ref 8.4–10.2)
CHLORIDE SERPL-SCNC: 111 MMOL/L (ref 96–108)
CLARITY UR: CLEAR
CO2 SERPL-SCNC: 28 MMOL/L (ref 21–32)
COLOR UR: ABNORMAL
CREAT SERPL-MCNC: 0.77 MG/DL (ref 0.6–1.3)
EOSINOPHIL # BLD AUTO: 0.14 THOUSAND/ÂΜL (ref 0–0.61)
EOSINOPHIL NFR BLD AUTO: 1 % (ref 0–6)
ERYTHROCYTE [DISTWIDTH] IN BLOOD BY AUTOMATED COUNT: 12.9 % (ref 11.6–15.1)
GFR SERPL CREATININE-BSD FRML MDRD: 81 ML/MIN/1.73SQ M
GLUCOSE SERPL-MCNC: 102 MG/DL (ref 65–140)
GLUCOSE UR STRIP-MCNC: ABNORMAL MG/DL
HCT VFR BLD AUTO: 42.4 % (ref 34.8–46.1)
HGB BLD-MCNC: 13.9 G/DL (ref 11.5–15.4)
HGB UR QL STRIP.AUTO: ABNORMAL
IMM GRANULOCYTES # BLD AUTO: 0.04 THOUSAND/UL (ref 0–0.2)
IMM GRANULOCYTES NFR BLD AUTO: 0 % (ref 0–2)
KETONES UR STRIP-MCNC: NEGATIVE MG/DL
LACTATE SERPL-SCNC: 0.6 MMOL/L (ref 0.5–2)
LEUKOCYTE ESTERASE UR QL STRIP: ABNORMAL
LIPASE SERPL-CCNC: 9 U/L (ref 11–82)
LYMPHOCYTES # BLD AUTO: 2.96 THOUSANDS/ÂΜL (ref 0.6–4.47)
LYMPHOCYTES NFR BLD AUTO: 22 % (ref 14–44)
MAGNESIUM SERPL-MCNC: 1.8 MG/DL (ref 1.9–2.7)
MCH RBC QN AUTO: 31.1 PG (ref 26.8–34.3)
MCHC RBC AUTO-ENTMCNC: 32.8 G/DL (ref 31.4–37.4)
MCV RBC AUTO: 95 FL (ref 82–98)
MONOCYTES # BLD AUTO: 0.82 THOUSAND/ÂΜL (ref 0.17–1.22)
MONOCYTES NFR BLD AUTO: 6 % (ref 4–12)
MUCOUS THREADS UR QL AUTO: ABNORMAL
NEUTROPHILS # BLD AUTO: 9.55 THOUSANDS/ÂΜL (ref 1.85–7.62)
NEUTS SEG NFR BLD AUTO: 71 % (ref 43–75)
NITRITE UR QL STRIP: POSITIVE
NON-SQ EPI CELLS URNS QL MICRO: ABNORMAL /HPF
NRBC BLD AUTO-RTO: 0 /100 WBCS
PH UR STRIP.AUTO: 5 [PH]
PLATELET # BLD AUTO: 273 THOUSANDS/UL (ref 149–390)
PMV BLD AUTO: 8.5 FL (ref 8.9–12.7)
POTASSIUM SERPL-SCNC: 4 MMOL/L (ref 3.5–5.3)
PROT SERPL-MCNC: 6.8 G/DL (ref 6.4–8.4)
PROT UR STRIP-MCNC: ABNORMAL MG/DL
RBC # BLD AUTO: 4.47 MILLION/UL (ref 3.81–5.12)
RBC #/AREA URNS AUTO: ABNORMAL /HPF
SODIUM SERPL-SCNC: 134 MMOL/L (ref 135–147)
SP GR UR STRIP.AUTO: 1.02 (ref 1–1.03)
UROBILINOGEN UR QL STRIP.AUTO: 4 E.U./DL
WBC # BLD AUTO: 13.55 THOUSAND/UL (ref 4.31–10.16)
WBC #/AREA URNS AUTO: ABNORMAL /HPF

## 2024-03-15 PROCEDURE — 88300 SURGICAL PATH GROSS: CPT | Performed by: PATHOLOGY

## 2024-03-15 PROCEDURE — 99222 1ST HOSP IP/OBS MODERATE 55: CPT | Performed by: INTERNAL MEDICINE

## 2024-03-15 PROCEDURE — 83735 ASSAY OF MAGNESIUM: CPT | Performed by: EMERGENCY MEDICINE

## 2024-03-15 PROCEDURE — 85025 COMPLETE CBC W/AUTO DIFF WBC: CPT | Performed by: EMERGENCY MEDICINE

## 2024-03-15 PROCEDURE — 80053 COMPREHEN METABOLIC PANEL: CPT | Performed by: EMERGENCY MEDICINE

## 2024-03-15 PROCEDURE — 87040 BLOOD CULTURE FOR BACTERIA: CPT | Performed by: INTERNAL MEDICINE

## 2024-03-15 PROCEDURE — 82360 CALCULUS ASSAY QUANT: CPT | Performed by: SPECIALIST

## 2024-03-15 PROCEDURE — 74176 CT ABD & PELVIS W/O CONTRAST: CPT

## 2024-03-15 PROCEDURE — 99284 EMERGENCY DEPT VISIT MOD MDM: CPT

## 2024-03-15 PROCEDURE — 87086 URINE CULTURE/COLONY COUNT: CPT | Performed by: INTERNAL MEDICINE

## 2024-03-15 PROCEDURE — 99285 EMERGENCY DEPT VISIT HI MDM: CPT | Performed by: EMERGENCY MEDICINE

## 2024-03-15 PROCEDURE — 96375 TX/PRO/DX INJ NEW DRUG ADDON: CPT

## 2024-03-15 PROCEDURE — 83690 ASSAY OF LIPASE: CPT | Performed by: EMERGENCY MEDICINE

## 2024-03-15 PROCEDURE — 81001 URINALYSIS AUTO W/SCOPE: CPT | Performed by: EMERGENCY MEDICINE

## 2024-03-15 PROCEDURE — 74018 RADEX ABDOMEN 1 VIEW: CPT

## 2024-03-15 PROCEDURE — 96365 THER/PROPH/DIAG IV INF INIT: CPT

## 2024-03-15 PROCEDURE — 36415 COLL VENOUS BLD VENIPUNCTURE: CPT | Performed by: EMERGENCY MEDICINE

## 2024-03-15 PROCEDURE — 96361 HYDRATE IV INFUSION ADD-ON: CPT

## 2024-03-15 PROCEDURE — 83605 ASSAY OF LACTIC ACID: CPT | Performed by: INTERNAL MEDICINE

## 2024-03-15 RX ORDER — ONDANSETRON 2 MG/ML
4 INJECTION INTRAMUSCULAR; INTRAVENOUS ONCE AS NEEDED
Status: DISCONTINUED | OUTPATIENT
Start: 2024-03-15 | End: 2024-03-15 | Stop reason: HOSPADM

## 2024-03-15 RX ORDER — LIDOCAINE HYDROCHLORIDE 10 MG/ML
INJECTION, SOLUTION EPIDURAL; INFILTRATION; INTRACAUDAL; PERINEURAL AS NEEDED
Status: DISCONTINUED | OUTPATIENT
Start: 2024-03-15 | End: 2024-03-15

## 2024-03-15 RX ORDER — FENTANYL CITRATE 50 UG/ML
INJECTION, SOLUTION INTRAMUSCULAR; INTRAVENOUS AS NEEDED
Status: DISCONTINUED | OUTPATIENT
Start: 2024-03-15 | End: 2024-03-15

## 2024-03-15 RX ORDER — ONDANSETRON 2 MG/ML
4 INJECTION INTRAMUSCULAR; INTRAVENOUS EVERY 6 HOURS PRN
Status: DISCONTINUED | OUTPATIENT
Start: 2024-03-15 | End: 2024-03-16 | Stop reason: HOSPADM

## 2024-03-15 RX ORDER — ACETAMINOPHEN 325 MG/1
650 TABLET ORAL EVERY 6 HOURS PRN
Status: DISCONTINUED | OUTPATIENT
Start: 2024-03-15 | End: 2024-03-16 | Stop reason: HOSPADM

## 2024-03-15 RX ORDER — PROPOFOL 10 MG/ML
INJECTION, EMULSION INTRAVENOUS AS NEEDED
Status: DISCONTINUED | OUTPATIENT
Start: 2024-03-15 | End: 2024-03-15

## 2024-03-15 RX ORDER — MAGNESIUM HYDROXIDE 1200 MG/15ML
LIQUID ORAL AS NEEDED
Status: DISCONTINUED | OUTPATIENT
Start: 2024-03-15 | End: 2024-03-15 | Stop reason: HOSPADM

## 2024-03-15 RX ORDER — HYDROMORPHONE HCL/PF 1 MG/ML
0.5 SYRINGE (ML) INJECTION
Status: DISCONTINUED | OUTPATIENT
Start: 2024-03-15 | End: 2024-03-15 | Stop reason: HOSPADM

## 2024-03-15 RX ORDER — HEPARIN SODIUM 5000 [USP'U]/ML
5000 INJECTION, SOLUTION INTRAVENOUS; SUBCUTANEOUS EVERY 8 HOURS SCHEDULED
Status: DISCONTINUED | OUTPATIENT
Start: 2024-03-15 | End: 2024-03-16 | Stop reason: HOSPADM

## 2024-03-15 RX ORDER — MIDAZOLAM HYDROCHLORIDE 2 MG/2ML
INJECTION, SOLUTION INTRAMUSCULAR; INTRAVENOUS AS NEEDED
Status: DISCONTINUED | OUTPATIENT
Start: 2024-03-15 | End: 2024-03-15

## 2024-03-15 RX ORDER — DEXAMETHASONE SODIUM PHOSPHATE 10 MG/ML
INJECTION, SOLUTION INTRAMUSCULAR; INTRAVENOUS AS NEEDED
Status: DISCONTINUED | OUTPATIENT
Start: 2024-03-15 | End: 2024-03-15

## 2024-03-15 RX ORDER — SODIUM CHLORIDE 9 MG/ML
100 INJECTION, SOLUTION INTRAVENOUS CONTINUOUS
Status: DISCONTINUED | OUTPATIENT
Start: 2024-03-15 | End: 2024-03-16 | Stop reason: HOSPADM

## 2024-03-15 RX ORDER — KETOROLAC TROMETHAMINE 30 MG/ML
15 INJECTION, SOLUTION INTRAMUSCULAR; INTRAVENOUS ONCE
Status: COMPLETED | OUTPATIENT
Start: 2024-03-15 | End: 2024-03-15

## 2024-03-15 RX ORDER — HYDROMORPHONE HCL/PF 1 MG/ML
0.2 SYRINGE (ML) INJECTION
Status: DISCONTINUED | OUTPATIENT
Start: 2024-03-15 | End: 2024-03-16

## 2024-03-15 RX ORDER — OXYCODONE HYDROCHLORIDE 10 MG/1
10 TABLET ORAL EVERY 6 HOURS PRN
Status: DISCONTINUED | OUTPATIENT
Start: 2024-03-15 | End: 2024-03-16

## 2024-03-15 RX ORDER — OXYCODONE HYDROCHLORIDE 5 MG/1
5 TABLET ORAL EVERY 6 HOURS PRN
Status: DISCONTINUED | OUTPATIENT
Start: 2024-03-15 | End: 2024-03-16

## 2024-03-15 RX ORDER — MAGNESIUM HYDROXIDE/ALUMINUM HYDROXICE/SIMETHICONE 120; 1200; 1200 MG/30ML; MG/30ML; MG/30ML
30 SUSPENSION ORAL EVERY 6 HOURS PRN
Status: DISCONTINUED | OUTPATIENT
Start: 2024-03-15 | End: 2024-03-16 | Stop reason: HOSPADM

## 2024-03-15 RX ORDER — ONDANSETRON 2 MG/ML
INJECTION INTRAMUSCULAR; INTRAVENOUS AS NEEDED
Status: DISCONTINUED | OUTPATIENT
Start: 2024-03-15 | End: 2024-03-15

## 2024-03-15 RX ORDER — CEFTRIAXONE 1 G/50ML
1000 INJECTION, SOLUTION INTRAVENOUS ONCE
Status: COMPLETED | OUTPATIENT
Start: 2024-03-15 | End: 2024-03-15

## 2024-03-15 RX ORDER — SACCHAROMYCES BOULARDII 250 MG
250 CAPSULE ORAL DAILY
Status: DISCONTINUED | OUTPATIENT
Start: 2024-03-16 | End: 2024-03-16 | Stop reason: HOSPADM

## 2024-03-15 RX ORDER — UREA 10 %
1 LOTION (ML) TOPICAL EVERY MORNING
COMMUNITY

## 2024-03-15 RX ORDER — CEFTRIAXONE 1 G/50ML
1000 INJECTION, SOLUTION INTRAVENOUS EVERY 24 HOURS
Status: DISCONTINUED | OUTPATIENT
Start: 2024-03-16 | End: 2024-03-16 | Stop reason: HOSPADM

## 2024-03-15 RX ORDER — MAGNESIUM SULFATE HEPTAHYDRATE 40 MG/ML
2 INJECTION, SOLUTION INTRAVENOUS ONCE
Status: COMPLETED | OUTPATIENT
Start: 2024-03-15 | End: 2024-03-15

## 2024-03-15 RX ADMIN — MAGNESIUM SULFATE HEPTAHYDRATE 2 G: 40 INJECTION, SOLUTION INTRAVENOUS at 14:06

## 2024-03-15 RX ADMIN — LIDOCAINE HYDROCHLORIDE 50 MG: 10 INJECTION, SOLUTION EPIDURAL; INFILTRATION; INTRACAUDAL; PERINEURAL at 18:53

## 2024-03-15 RX ADMIN — CEFTRIAXONE 1000 MG: 1 INJECTION, SOLUTION INTRAVENOUS at 11:23

## 2024-03-15 RX ADMIN — FENTANYL CITRATE 50 MCG: 50 INJECTION, SOLUTION INTRAMUSCULAR; INTRAVENOUS at 19:08

## 2024-03-15 RX ADMIN — KETOROLAC TROMETHAMINE 15 MG: 30 INJECTION, SOLUTION INTRAMUSCULAR; INTRAVENOUS at 10:13

## 2024-03-15 RX ADMIN — SODIUM CHLORIDE 100 ML/HR: 0.9 INJECTION, SOLUTION INTRAVENOUS at 13:59

## 2024-03-15 RX ADMIN — SODIUM CHLORIDE 1000 ML: 0.9 INJECTION, SOLUTION INTRAVENOUS at 10:10

## 2024-03-15 RX ADMIN — PROPOFOL 150 MG: 10 INJECTION, EMULSION INTRAVENOUS at 18:53

## 2024-03-15 RX ADMIN — ONDANSETRON 4 MG: 2 INJECTION INTRAMUSCULAR; INTRAVENOUS at 19:06

## 2024-03-15 RX ADMIN — FENTANYL CITRATE 50 MCG: 50 INJECTION, SOLUTION INTRAMUSCULAR; INTRAVENOUS at 18:59

## 2024-03-15 RX ADMIN — DEXAMETHASONE SODIUM PHOSPHATE 10 MG: 10 INJECTION, SOLUTION INTRAMUSCULAR; INTRAVENOUS at 19:06

## 2024-03-15 RX ADMIN — ACETAMINOPHEN 650 MG: 325 TABLET ORAL at 21:40

## 2024-03-15 RX ADMIN — MIDAZOLAM 2 MG: 1 INJECTION INTRAMUSCULAR; INTRAVENOUS at 18:43

## 2024-03-15 RX ADMIN — SODIUM CHLORIDE 100 ML/HR: 0.9 INJECTION, SOLUTION INTRAVENOUS at 19:58

## 2024-03-15 NOTE — DISCHARGE INSTR - AVS FIRST PAGE
Follow ups:  -Urology (Dr. Henry office number on discharge paperwork. Call Monday for an appointment)    New prescriptions:  -Keflex 500mg twice daily x 7 days    Other instructions:  -Will follow up regarding urine culture and blood culture results        Urology instructions  #1 no heavy straining or lifting above 10 pounds for 2 weeks    #2 call office fevers, chills, or worsening blood in the urine.    #3  Patient to call office to present next Monday or Tuesday for stent removal.      Geoffrey Henry M.D. [facility] office  [address] [place] [address].  [place] [address]  [phone]  8:30 AM to 4:30 PM  Monday through Friday    [place] office  [address]  [address]  [place] [address] [phone]  1:00 to 5:00 PM  Wednesday

## 2024-03-15 NOTE — PLAN OF CARE
Problem: PAIN - ADULT  Goal: Verbalizes/displays adequate comfort level or baseline comfort level  Description: Interventions:  - Encourage patient to monitor pain and request assistance  - Assess pain using appropriate pain scale  - Administer analgesics based on type and severity of pain and evaluate response  - Implement non-pharmacological measures as appropriate and evaluate response  - Consider cultural and social influences on pain and pain management  - Notify physician/advanced practitioner if interventions unsuccessful or patient reports new pain  Outcome: Progressing     Problem: INFECTION - ADULT  Goal: Absence or prevention of progression during hospitalization  Description: INTERVENTIONS:  - Assess and monitor for signs and symptoms of infection  - Monitor lab/diagnostic results  - Monitor all insertion sites, i.e. indwelling lines, tubes, and drains  - Monitor endotracheal if appropriate and nasal secretions for changes in amount and color  - Peck appropriate cooling/warming therapies per order  - Administer medications as ordered  - Instruct and encourage patient and family to use good hand hygiene technique  - Identify and instruct in appropriate isolation precautions for identified infection/condition  Outcome: Progressing     Problem: DISCHARGE PLANNING  Goal: Discharge to home or other facility with appropriate resources  Description: INTERVENTIONS:  - Identify barriers to discharge w/patient and caregiver  - Arrange for needed discharge resources and transportation as appropriate  - Identify discharge learning needs (meds, wound care, etc.)  - Arrange for interpretive services to assist at discharge as needed  - Refer to Case Management Department for coordinating discharge planning if the patient needs post-hospital services based on physician/advanced practitioner order or complex needs related to functional status, cognitive ability, or social support system  Outcome: Progressing      Problem: Knowledge Deficit  Goal: Patient/family/caregiver demonstrates understanding of disease process, treatment plan, medications, and discharge instructions  Description: Complete learning assessment and assess knowledge base.  Interventions:  - Provide teaching at level of understanding  - Provide teaching via preferred learning methods  Outcome: Progressing     Problem: GENITOURINARY - ADULT  Goal: Maintains or returns to baseline urinary function  Description: INTERVENTIONS:  - Assess urinary function  - Encourage oral fluids to ensure adequate hydration if ordered  - Administer IV fluids as ordered to ensure adequate hydration  - Administer ordered medications as needed  - Offer frequent toileting  - Follow urinary retention protocol if ordered  Outcome: Progressing  Goal: Absence of urinary retention  Description: INTERVENTIONS:  - Assess patient’s ability to void and empty bladder  - Monitor I/O  - Bladder scan as needed  - Discuss with physician/AP medications to alleviate retention as needed  - Discuss catheterization for long term situations as appropriate  Outcome: Progressing

## 2024-03-15 NOTE — H&P
[facility]  H&P  Name: Fe Morataya 63 y.o. female I MRN: [id number]  Unit/Bed#: ED 03 I Date of Admission: 3/15/2024   Date of Service: 3/15/2024 I Hospital Day: 0      Assessment/Plan   * Ureteral calculus  Assessment & Plan  Presents to the ED with dysuria and left flank pain that started about 2 days ago.  She denies any associated fever/chills, chest pain, abdominal pain, nausea or vomiting.  CT with evidence of 3mm calculus at the left ureterovesicular junction  UA positive for infection- started on IV ceftriaxone  Strain all urine  Start on continuous IVF  Pain control  Keep NPO  Consult to Urology, plans for cystoscopy this evening    UTI (urinary tract infection)  Assessment & Plan  UA positive for leukocytes and nitrites  Does not meet sepsis criteria  Started on IV ceftriaxone  Follow up urine culture    Hypomagnesemia  Assessment & Plan  Magnesium 1.8 on admission  Supplementation with 2g IV mag  Recheck with AM labs    Smoking  Assessment & Plan  Current social smoker  Denies any nicotine supplementation           VTE Pharmacologic Prophylaxis: VTE Score: 4 Moderate Risk (Score 3-4) - Pharmacological DVT Prophylaxis Ordered: heparin.  Code Status: Level 1 - Full Code   Discussion with family: Updated  () via phone.    Anticipated Length of Stay: Patient will be admitted on an observation basis with an anticipated length of stay of less than 2 midnights secondary to ureteral calculus.    Chief Complaint: Dysuria    History of Present Illness:  Fe Morataya is a 63 y.o. female with a PMH of tobacco abuse.  Patient presents to the ED for dysuria and left flank pain that started about 2 days ago.  Patient denies any associated fever/chills, chest pain, shortness of breath, abdominal pain, nausea/vomiting/diarrhea.  Patient underwent CT scan which did show 3 mm left ureteral calculus and she does have evidence of infection on her urine screening.  Patient will  require admission for IV antibiotics and possible cystoscopy by urology service.  All patient questions answered to the best of my ability.    Review of Systems:  Review of Systems   Constitutional:  Negative for chills and fever.   HENT:  Negative for ear pain and sore throat.    Eyes:  Negative for pain and visual disturbance.   Respiratory:  Negative for cough and shortness of breath.    Cardiovascular:  Negative for chest pain and palpitations.   Gastrointestinal:  Negative for abdominal pain and vomiting.   Genitourinary:  Positive for dysuria and flank pain. Negative for hematuria.   Musculoskeletal:  Negative for arthralgias and back pain.   Skin:  Negative for color change and rash.   Neurological:  Negative for seizures and syncope.   All other systems reviewed and are negative.      Past Medical and Surgical History:   Past Medical History:   Diagnosis Date    Breast lump     Cancer (HCC)     ca of upper lip recently       Past Surgical History:   Procedure Laterality Date    BREAST SURGERY Left 2013    KS LAPAROSCOPY SURG CHOLECYSTECTOMY N/A 4/28/2021    Procedure: CHOLECYSTECTOMY LAPAROSCOPIC;  Surgeon: Bear Keating MD;  Location: [facility];  Service: General       Meds/Allergies:  Prior to Admission medications    Medication Sig Start Date End Date Taking? Authorizing Provider   Ascorbic Acid (vitamin C) 1000 MG tablet Take 1,000 mg by mouth daily    Historical Provider, MD   oxyCODONE-acetaminophen (PERCOCET) 5-325 mg per tablet Take 1 tablet by mouth every 4 (four) hours as needed for moderate pain or severe pain for up to 8 dosesMax Daily Amount: 6 tablets  Patient not taking: Reported on 5/10/2021 4/28/21   Bear Keating MD     I have reviewed home medications using recent Epic encounter.    Allergies: No Known Allergies    Social History:  Marital Status: /Civil Union   Occupation: NA  Patient Pre-hospital Living Situation: Home  Patient Pre-hospital Level of Mobility: walks  Patient  Pre-hospital Diet Restrictions: None  Substance Use History:   Social History     Substance and Sexual Activity   Alcohol Use Yes    Comment: Socially     Social History     Tobacco Use   Smoking Status Every Day    Current packs/day: 0.50    Average packs/day: 0.5 packs/day for 40.0 years (20.0 ttl pk-yrs)    Types: Cigarettes   Smokeless Tobacco Never     Social History     Substance and Sexual Activity   Drug Use Never       Family History:  Family History   Problem Relation Age of Onset    Cancer Father        Physical Exam:     Vitals:   Blood Pressure: 119/76 (03/15/24 1243)  Pulse: 74 (03/15/24 1243)  Temperature: 98 °F (36.7 °C) (03/15/24 1243)  Temp Source: Oral (03/15/24 1243)  Respirations: 18 (03/15/24 1243)  Weight - Scale: 57.2 kg (126 lb) (03/15/24 1000)  SpO2: 96 % (03/15/24 1243)    Physical Exam  Vitals and nursing note reviewed.   Constitutional:       General: She is not in acute distress.     Appearance: She is well-developed.   HENT:      Head: Normocephalic and atraumatic.   Eyes:      Conjunctiva/sclera: Conjunctivae normal.   Cardiovascular:      Rate and Rhythm: Normal rate and regular rhythm.      Heart sounds: No murmur heard.  Pulmonary:      Effort: Pulmonary effort is normal. No respiratory distress.      Breath sounds: Normal breath sounds.   Abdominal:      Palpations: Abdomen is soft.      Tenderness: There is no abdominal tenderness. There is left CVA tenderness.   Musculoskeletal:         General: No swelling.      Cervical back: Neck supple.   Skin:     General: Skin is warm and dry.      Capillary Refill: Capillary refill takes less than 2 seconds.   Neurological:      Mental Status: She is alert. Mental status is at baseline.   Psychiatric:         Mood and Affect: Mood normal.          Additional Data:     Lab Results:  Results from last 7 days   Lab Units 03/15/24  1010   WBC Thousand/uL 13.55*   HEMOGLOBIN g/dL 13.9   HEMATOCRIT % 42.4   PLATELETS Thousands/uL 273   NEUTROS  PCT % 71   LYMPHS PCT % 22   MONOS PCT % 6   EOS PCT % 1     Results from last 7 days   Lab Units 03/15/24  1010   SODIUM mmol/L 134*   POTASSIUM mmol/L 4.0   CHLORIDE mmol/L 111*   CO2 mmol/L 28   BUN mg/dL 20   CREATININE mg/dL 0.77   ANION GAP mmol/L -5*   CALCIUM mg/dL 9.2   ALBUMIN g/dL 4.3   TOTAL BILIRUBIN mg/dL 0.75   ALK PHOS U/L 69   ALT U/L 9   AST U/L 13   GLUCOSE RANDOM mg/dL 102                       Lines/Drains:  Invasive Devices       Peripheral Intravenous Line  Duration             Peripheral IV 03/15/24 Right Antecubital <1 day                        Imaging: Reviewed radiology reports from this admission including: CT renal stone A/P  CT renal stone study abdomen pelvis wo contrast   Final Result by Rupesh Beebe MD (03/15 1129)      3 mm calculus at the left ureterovesicular junction with minimal ectasia of the ureter and renal pelvis. Multiple additional small nonobstructing renal calculi bilaterally.            The study was marked in EPIC for immediate notification.      Workstation performed: [id number]         FL retrograde pyelogram    (Results Pending)       EKG and Other Studies Reviewed on Admission:   EKG: No EKG obtained.    ** Please Note: This note has been constructed using a voice recognition system. **

## 2024-03-15 NOTE — ASSESSMENT & PLAN NOTE
Presents to the ED with dysuria and left flank pain that started about 2 days ago.  She denies any associated fever/chills, chest pain, abdominal pain, nausea or vomiting.  CT with evidence of 3mm calculus at the left ureterovesicular junction  UA positive for infection- started on IV ceftriaxone  Strain all urine  Start on continuous IVF  Pain control  Keep NPO  Consult to Urology, plans for cystoscopy this evening

## 2024-03-15 NOTE — ANESTHESIA POSTPROCEDURE EVALUATION
Post-Op Assessment Note    CV Status:  Stable  Pain Score: 0    Pain management: adequate    Multimodal analgesia used between 6 hours prior to anesthesia start to PACU discharge    Mental Status:  Sleepy   PONV Controlled:  Controlled     Post Op Vitals Reviewed: Yes    No anethesia notable event occurred.    Staff: Anesthesiologist               BP      Temp      Pulse     Resp      SpO2

## 2024-03-15 NOTE — ASSESSMENT & PLAN NOTE
UA positive for leukocytes and nitrites  Does not meet sepsis criteria  Started on IV ceftriaxone  Follow up urine culture

## 2024-03-15 NOTE — OP NOTE
OPERATIVE REPORT  PATIENT NAME: Fe Morataya    :  1960  MRN: [id number]  Pt Location: [facility] OR ROOM 04    SURGERY DATE: 3/15/2024    Surgeons and Role:     * Geoffrey Henry MD - Primary    Preop Diagnosis:  Ureteral stone with hydronephrosis [N13.2]    Post-Op Diagnosis Codes:     * Ureteral stone with hydronephrosis [N13.2]    Procedure(s):  Left - CYSTOSCOPY URETEROSCOPY. BASKET STONE EXTRACTION AND INSERTION STENT URETERAL    Specimen(s):  ID Type Source Tests Collected by Time Destination   1 : LEFT URETER STONE Calculus Ureter, Left STONE ANALYSIS, TISSUE EXAM Geoffrey Henry MD 3/15/2024 1910        Estimated Blood Loss:   Minimal    Drains:  Ureteral Internal Stent Left ureter 6 Fr. (Active)   Number of days: 0       Anesthesia Type:   General/LMA    Operative Indications:  Ureteral stone with hydronephrosis [N13.2]  This 63-year-old female presented to [facility] ER with acute onset left lower quadrant left flank pain found on CAT scan to have a 3 mm left distal ureterovesical junction stone questionable UTI admitted for analgesics seen at the bedside now in light of possible UTI to undergo cystoscopy stent possible ureteroscopy stone extraction aware risk of anesthesia infection bleeding additional Yannes procedures    Operative Findings:  3 mm stone noted in the orifice mild left ureteroscopy basket extraction 24 cm 6 Yi stent passed    Complications:   None    Procedure and Technique:  Patient identified in the holding area left side marked consent signed placed the op suite after anesthesia induced placed in thigh position the draped and prepped standard fashion timeout performed 22 Yi scope passed urethra to the bladder confirming normal bladder the 3 mm stone was noted to be just proximal to the orifice and easily seen 8.038 wire was passed upward by the stone into the left renal pelvis under fluoroscopic control the ureteroscope was easily passed basket removed cystoscope  replaced and over the wire a 24 cm 6 Nigerien stent was passed wire removed scope removed patient procedure awakened taken recovery room stable condition   I was present for the entire procedure.    Patient Disposition:  PACU         SIGNATURE: Geoffrey Henry MD  DATE: March 15, 2024  TIME: 7:21 PM

## 2024-03-15 NOTE — ANESTHESIA PREPROCEDURE EVALUATION
Procedure:  CYSTOSCOPY URETEROSCOPY WITH LITHOTRIPSY HOLMIUM LASER, RETROGRADE PYELOGRAM AND INSERTION STENT URETERAL (Left: Bladder)    Relevant Problems   PULMONARY   (+) Smoking        Physical Exam    Airway    Mallampati score: I  TM Distance: >3 FB  Neck ROM: full     Dental   No notable dental hx     Cardiovascular  Rhythm: regular, Rate: normal, Cardiovascular exam normal    Pulmonary  Pulmonary exam normal Breath sounds clear to auscultation    Other Findings  post-pubertal.      Anesthesia Plan  ASA Score- 2     Anesthesia Type- general with ASA Monitors.         Additional Monitors:     Airway Plan: LMA.           Plan Factors-Exercise tolerance (METS): >4 METS.    Chart reviewed. EKG reviewed. Imaging results reviewed. Existing labs reviewed. Patient summary reviewed.    Patient is not a current smoker.  Patient did not smoke on day of surgery.            Induction- intravenous.    Postoperative Plan- Plan for postoperative opioid use. Planned trial extubation    Informed Consent- Anesthetic plan and risks discussed with patient.

## 2024-03-15 NOTE — ED PROVIDER NOTES
History  Chief Complaint   Patient presents with    Flank Pain     Patient states yesterday started with urinary frequency/presssure - started herself on Azo.  Then started with left flank and back pain.     63-year-old female presents with left flank pain radiating to her left lower abdomen sharp continuous currently 6 out of 10 nothing makes it better or worse.  She started with dysuria urgency and frequency no fevers chills nausea vomiting patient diarrhea or any other symptoms no rash or trauma noted      History provided by:  Patient   used: No        Prior to Admission Medications   Prescriptions Last Dose Informant Patient Reported? Taking?   Ascorbic Acid (vitamin C) 1000 MG tablet 3/15/2024 Self Yes Yes   Sig: Take 1,000 mg by mouth daily   Lactobacillus TABS   Yes No   Sig: Take 1 tablet by mouth every morning   oxyCODONE-acetaminophen (PERCOCET) 5-325 mg per tablet   No No   Sig: Take 1 tablet by mouth every 4 (four) hours as needed for moderate pain or severe pain for up to 8 dosesMax Daily Amount: 6 tablets   Patient not taking: Reported on 5/10/2021      Facility-Administered Medications: None       Past Medical History:   Diagnosis Date    Breast lump     Cancer (HCC)     ca of upper lip recently       Past Surgical History:   Procedure Laterality Date    BREAST SURGERY Left 2013    [facility] LAPAROSCOPY SURG CHOLECYSTECTOMY N/A 4/28/2021    Procedure: CHOLECYSTECTOMY LAPAROSCOPIC;  Surgeon: Bear Keating MD;  Location: [facility];  Service: General       Family History   Problem Relation Age of Onset    Cancer Father      I have reviewed and agree with the history as documented.    E-Cigarette/Vaping    E-Cigarette Use Never User      E-Cigarette/Vaping Substances     Social History     Tobacco Use    Smoking status: Every Day     Current packs/day: 0.50     Average packs/day: 0.5 packs/day for 40.0 years (20.0 ttl pk-yrs)     Types: Cigarettes    Smokeless tobacco: Never   Vaping Use     Vaping status: Never Used   Substance Use Topics    Alcohol use: Yes     Comment: Socially    Drug use: Never       Review of Systems   Constitutional: Negative.    HENT: Negative.     Eyes: Negative.    Respiratory: Negative.     Cardiovascular: Negative.    Gastrointestinal:  Positive for abdominal pain.   Endocrine: Negative.    Genitourinary:  Positive for dysuria, flank pain and frequency.   Skin: Negative.    Allergic/Immunologic: Negative.    Neurological: Negative.    Hematological: Negative.    Psychiatric/Behavioral: Negative.     All other systems reviewed and are negative.      Physical Exam  Physical Exam  Vitals and nursing note reviewed.   Constitutional:       Appearance: Normal appearance.   HENT:      Head: Normocephalic and atraumatic.      Nose: Nose normal.      Mouth/Throat:      Mouth: Mucous membranes are moist.   Eyes:      Extraocular Movements: Extraocular movements intact.      Pupils: Pupils are equal, round, and reactive to light.   Cardiovascular:      Rate and Rhythm: Normal rate and regular rhythm.   Pulmonary:      Effort: Pulmonary effort is normal.      Breath sounds: Normal breath sounds.   Abdominal:      General: Abdomen is flat. Bowel sounds are normal.      Palpations: Abdomen is soft.      Comments: Left-sided CVA tenderness left lower abdominal tenderness   Musculoskeletal:         General: Normal range of motion.      Cervical back: Normal range of motion and neck supple.   Skin:     General: Skin is warm.      Capillary Refill: Capillary refill takes less than 2 seconds.   Neurological:      General: No focal deficit present.      Mental Status: She is alert and oriented to person, place, and time. Mental status is at baseline.   Psychiatric:         Mood and Affect: Mood normal.         Thought Content: Thought content normal.         Vital Signs  ED Triage Vitals   Temperature Pulse Respirations Blood Pressure SpO2   03/15/24 1000 03/15/24 1000 03/15/24 1000 03/15/24  1000 03/15/24 1000   98.4 °F (36.9 °C) 76 18 139/76 95 %      Temp Source Heart Rate Source Patient Position - Orthostatic VS BP Location FiO2 (%)   03/15/24 1000 03/15/24 1000 03/15/24 1000 03/15/24 1000 --   Oral Monitor Lying Left arm       Pain Score       03/15/24 1013       2           Vitals:    03/15/24 1243 03/15/24 1245 03/15/24 1344 03/15/24 1548   BP: 119/76 119/76 122/78 116/80   Pulse: 74 72 72 68   Patient Position - Orthostatic VS: Lying            Visual Acuity      ED Medications  Medications   sodium chloride 0.9 % infusion (100 mL/hr Intravenous New Bag 3/15/24 1359)   acetaminophen (TYLENOL) tablet 650 mg (has no administration in time range)   heparin (porcine) subcutaneous injection 5,000 Units (5,000 Units Subcutaneous Not Given 3/15/24 1409)   cefTRIAXone (ROCEPHIN) IVPB (premix in dextrose) 1,000 mg 50 mL (has no administration in time range)   oxyCODONE (ROXICODONE) IR tablet 5 mg (has no administration in time range)   oxyCODONE (ROXICODONE) immediate release tablet 10 mg (has no administration in time range)   HYDROmorphone (DILAUDID) injection 0.2 mg (has no administration in time range)   saccharomyces boulardii (FLORASTOR) capsule 250 mg (has no administration in time range)   sodium chloride 0.9 % bolus 1,000 mL (0 mL Intravenous Stopped 3/15/24 1243)   ketorolac (TORADOL) injection 15 mg (15 mg Intravenous Given 3/15/24 1013)   cefTRIAXone (ROCEPHIN) IVPB (premix in dextrose) 1,000 mg 50 mL (0 mg Intravenous Stopped 3/15/24 1241)   magnesium sulfate 2 g/50 mL IVPB (premix) 2 g (2 g Intravenous New Bag 3/15/24 1406)       Diagnostic Studies  Results Reviewed       Procedure Component Value Units Date/Time    Lactic acid, plasma (w/reflex if result > 2.0) [[id number]]  (Normal) Collected: 03/15/24 1252    Lab Status: Final result Specimen: Blood from Arm, Left Updated: 03/15/24 1320     LACTIC ACID 0.6 mmol/L     Narrative:      Result may be elevated if tourniquet was used during  collection.    Urine culture [[id number]] Collected: 03/15/24 1010    Lab Status: In process Specimen: Urine, Clean Catch Updated: 03/15/24 1259    Blood culture #2 [[id number]] Collected: 03/15/24 1252    Lab Status: In process Specimen: Blood from Arm, Right Updated: 03/15/24 1258    Blood culture #1 [[id number]] Collected: 03/15/24 1252    Lab Status: In process Specimen: Blood from Arm, Left Updated: 03/15/24 1258    Urine Microscopic [[id number]]  (Abnormal) Collected: 03/15/24 1010    Lab Status: Final result Specimen: Urine, Clean Catch Updated: 03/15/24 1109     RBC, UA 10-20 /hpf      WBC, UA 4-10 /hpf      Epithelial Cells Occasional /hpf      Bacteria, UA Occasional /hpf      MUCUS THREADS Occasional    Comprehensive metabolic panel [[id number]]  (Abnormal) Collected: 03/15/24 1010    Lab Status: Final result Specimen: Blood from Arm, Right Updated: 03/15/24 1108     Sodium 134 mmol/L      Potassium 4.0 mmol/L      Chloride 111 mmol/L      CO2 28 mmol/L      ANION GAP -5 mmol/L      BUN 20 mg/dL      Creatinine 0.77 mg/dL      Glucose 102 mg/dL      Calcium 9.2 mg/dL      AST 13 U/L      ALT 9 U/L      Alkaline Phosphatase 69 U/L      Total Protein 6.8 g/dL      Albumin 4.3 g/dL      Total Bilirubin 0.75 mg/dL      eGFR 81 ml/min/1.73sq m     Narrative:      National Kid[employer] guidelines for Chronic Kidney Disease (CKD):     Stage 1 with normal or high GFR (GFR > 90 mL/min/1.73 square meters)    Stage 2 Mild CKD (GFR = 60-89 mL/min/1.73 square meters)    Stage 3A Moderate CKD (GFR = 45-59 mL/min/1.73 square meters)    Stage 3B Moderate CKD (GFR = 30-44 mL/min/1.73 square meters)    Stage 4 Severe CKD (GFR = 15-29 mL/min/1.73 square meters)    Stage 5 End Stage CKD (GFR <15 mL/min/1.73 square meters)  Note: GFR calculation is accurate only with a steady state creatinine    Magnesium [[id number]]  (Abnormal) Collected: 03/15/24 1010    Lab Status: Final result Specimen: Blood from Arm, Right  Updated: 03/15/24 1108     Magnesium 1.8 mg/dL     Lipase [[id number]]  (Abnormal) Collected: 03/15/24 1010    Lab Status: Final result Specimen: Blood from Arm, Right Updated: 03/15/24 1108     Lipase 9 u/L     UA (URINE) with reflex to Scope [[id number]]  (Abnormal) Collected: 03/15/24 1010    Lab Status: Final result Specimen: Urine, Clean Catch Updated: 03/15/24 1031     Color, UA Orange     Clarity, UA Clear     Specific Gravity, UA 1.025     pH, UA 5.0     Leukocytes, UA Trace     Nitrite, UA Positive     Protein,  (2+) mg/dl      Glucose,  (1/10%) mg/dl      Ketones, UA Negative mg/dl      Urobilinogen, UA 4.0 E.U./dl      Bilirubin, UA Small     Occult Blood, UA Moderate    CBC and differential [[id number]]  (Abnormal) Collected: 03/15/24 1010    Lab Status: Final result Specimen: Blood from Arm, Right Updated: 03/15/24 1020     WBC 13.55 Thousand/uL      RBC 4.47 Million/uL      Hemoglobin 13.9 g/dL      Hematocrit 42.4 %      MCV 95 fL      MCH 31.1 pg      MCHC 32.8 g/dL      RDW 12.9 %      MPV 8.5 fL      Platelets 273 Thousands/uL      nRBC 0 /100 WBCs      Neutrophils Relative 71 %      Immature Grans % 0 %      Lymphocytes Relative 22 %      Monocytes Relative 6 %      Eosinophils Relative 1 %      Basophils Relative 0 %      Neutrophils Absolute 9.55 Thousands/µL      Absolute Immature Grans 0.04 Thousand/uL      Absolute Lymphocytes 2.96 Thousands/µL      Absolute Monocytes 0.82 Thousand/µL      Eosinophils Absolute 0.14 Thousand/µL      Basophils Absolute 0.04 Thousands/µL                    CT renal stone study abdomen pelvis wo contrast   Final Result by Rupesh Beebe MD (03/15 1129)      3 mm calculus at the left ureterovesicular junction with minimal ectasia of the ureter and renal pelvis. Multiple additional small nonobstructing renal calculi bilaterally.            The study was marked in EPIC for immediate notification.      Workstation performed: [id number]         FL retrograde  pyelogram    (Results Pending)              Procedures  Procedures         ED Course                                             Medical Decision Making  Patient evaluated in the ED with labs imaging urinalysis spoke to Dr. Henry who recommended admitting the patient for further evaluation and management cystoscopy in the evening she was given IV antibiotics.  Admit to the hospitalist    Problems Addressed:  Pyelonephritis: acute illness or injury  Ureteral stone: acute illness or injury    Amount and/or Complexity of Data Reviewed  Labs: ordered. Decision-making details documented in ED Course.  Radiology: ordered. Decision-making details documented in ED Course.    Risk  Prescription drug management.  Decision regarding hospitalization.             Disposition  Final diagnoses:   Pyelonephritis   Ureteral stone     Time reflects when diagnosis was documented in both MDM as applicable and the Disposition within this note       Time User Action Codes Description Comment    3/15/2024 12:04 PM Geoffrey Henry Add [N13.2] Ureteral stone with hydronephrosis     3/15/2024 12:20 PM AnepuNatalie Add [N12] Pyelonephritis     3/15/2024 12:20 PM AnepuNatalie Add [N20.1] Ureteral stone     3/15/2024 12:42 PM Vicki Thrasher Add [N20.1] Ureteral calculus           ED Disposition       ED Disposition   Admit    Condition   Stable    Date/Time   Fri Mar 15, 2024 12:20 PM    Comment                 Follow-up Information    None         Current Discharge Medication List        CONTINUE these medications which have NOT CHANGED    Details   Ascorbic Acid (vitamin C) 1000 MG tablet Take 1,000 mg by mouth daily      Lactobacillus TABS Take 1 tablet by mouth every morning      oxyCODONE-acetaminophen (PERCOCET) 5-325 mg per tablet Take 1 tablet by mouth every 4 (four) hours as needed for moderate pain or severe pain for up to 8 dosesMax Daily Amount: 6 tablets  Qty: 8 tablet, Refills: 0    Associated Diagnoses: Postoperative pain              No discharge procedures on file.    PDMP Review       None            ED Provider  Electronically Signed by             Natalie Linares DO  03/15/24 [id number]

## 2024-03-15 NOTE — CONSULTS
H&P Exam - Urology       Patient: Fe Morataya   : 1960 Sex: female   MRN: [id number]     CSN: [id number]      History of Present Illness   HPI:  Fe Morataya is a 63 y.o. female presenting to [facility] ER with acute onset left flank pain found on ER CAT scan to have 3 mm left distal ureterovesical junction stone some voiding issues possible complicated UTI admitted for antibiotics and pain control        Review of Systems:   Constitutional:  Negative for activity change, fever, chills and diaphoresis.   HENT: Negative for hearing loss and trouble swallowing.   Eyes: Negative for itching and visual disturbance.   Respiratory: Negative for chest tightness and shortness of breath.   Cardiovascular: Negative for chest pain, edema.   Gastrointestinal: Negative for abdominal distention, na abdominal pain, constipation, diarrhea, Nausea and vomiting.   Genitourinary: Negative for decreased urine volume, difficulty urinating, dysuria, enuresis, frequency, hematuria and urgency.   Musculoskeletal: Negative for gait problem and myalgias.   Neurological: Negative for dizziness and headaches.   Hematological: Does not bruise/bleed easily.       Historical Information   Past Medical History:   Diagnosis Date    Breast lump     Cancer (HCC)     ca of upper lip recently     Past Surgical History:   Procedure Laterality Date    BREAST SURGERY Left     [facility] LAPAROSCOPY SURG CHOLECYSTECTOMY N/A 2021    Procedure: CHOLECYSTECTOMY LAPAROSCOPIC;  Surgeon: Bear Keating MD;  Location: [facility];  Service: General     Social History   Social History     Substance and Sexual Activity   Alcohol Use Yes    Comment: Socially     Social History     Substance and Sexual Activity   Drug Use Never     Social History     Tobacco Use   Smoking Status Every Day    Current packs/day: 0.50    Average packs/day: 0.5 packs/day for 40.0 years (20.0 ttl pk-yrs)    Types: Cigarettes   Smokeless Tobacco Never     Family History:  "  Family History   Problem Relation Age of Onset    Cancer Father        Meds/Allergies   (Not in a hospital admission)    No Known Allergies    Objective   Vitals: /76 (BP Location: Left arm)   Pulse 76   Temp 98.4 °F (36.9 °C) (Oral)   Resp 18   Wt 57.2 kg (126 lb)   SpO2 95%   BMI 19.73 kg/m²     Physical Exam:  General Alert awake   Normocephalic atraumatic PERRLA  Lungs clear bilaterally  Cardiac normal S1 normal S2  Abdomen soft, flank pain  Extremities no edema    No intake/output data recorded.    Invasive Devices       Peripheral Intravenous Line  Duration             Peripheral IV 03/15/24 Right Antecubital <1 day                        Lab Results: CBC:   Lab Results   Component Value Date    WBC 13.55 (H) 03/15/2024    HGB 13.9 03/15/2024    HCT 42.4 03/15/2024    MCV 95 03/15/2024     03/15/2024    RBC 4.47 03/15/2024    MCH 31.1 03/15/2024    MCHC 32.8 03/15/2024    RDW 12.9 03/15/2024    MPV 8.5 (L) 03/15/2024    NRBC 0 03/15/2024     CMP:   Lab Results   Component Value Date     (H) 03/15/2024    CO2 28 03/15/2024    BUN 20 03/15/2024    CREATININE 0.77 03/15/2024    CALCIUM 9.2 03/15/2024    AST 13 03/15/2024    ALT 9 03/15/2024    ALKPHOS 69 03/15/2024    EGFR 81 03/15/2024     Urinalysis:   Lab Results   Component Value Date    COLORU Orange 03/15/2024    CLARITYU Clear 03/15/2024    SPECGRAV 1.025 03/15/2024    PHUR 5.0 03/15/2024    LEUKOCYTESUR Trace (A) 03/15/2024    NITRITE Positive (A) 03/15/2024    GLUCOSEU 100 (1/10%) (A) 03/15/2024    KETONESU Negative 03/15/2024    BILIRUBINUR Small (A) 03/15/2024    BLOODU Moderate (A) 03/15/2024     Urine Culture: No results found for: \"URINECX\"  PSA: No results found for: \"PSA\"        Assessment/ Plan:  Complicated UTI?  Left distal 3 mm vesicle junction stone has a 98% chance percent chance of passing with medical expulsion therapy  In light of hydronephrosis and complicated UTI would schedule urgent cystoscopy left stent " deann  Will discuss with patient after office hours today and could add to schedule deann Henry MD

## 2024-03-16 VITALS
TEMPERATURE: 98.4 F | SYSTOLIC BLOOD PRESSURE: 103 MMHG | OXYGEN SATURATION: 94 % | HEIGHT: 68 IN | WEIGHT: 128.3 LBS | BODY MASS INDEX: 19.45 KG/M2 | RESPIRATION RATE: 18 BRPM | HEART RATE: 87 BPM | DIASTOLIC BLOOD PRESSURE: 66 MMHG

## 2024-03-16 PROBLEM — E83.42 HYPOMAGNESEMIA: Status: RESOLVED | Noted: 2024-03-15 | Resolved: 2024-03-16

## 2024-03-16 PROBLEM — N20.1 URETERAL CALCULUS: Status: RESOLVED | Noted: 2024-03-15 | Resolved: 2024-03-16

## 2024-03-16 LAB
ANION GAP SERPL CALCULATED.3IONS-SCNC: 7 MMOL/L (ref 4–13)
BACTERIA UR CULT: NORMAL
BUN SERPL-MCNC: 14 MG/DL (ref 5–25)
CALCIUM SERPL-MCNC: 8.3 MG/DL (ref 8.4–10.2)
CHLORIDE SERPL-SCNC: 108 MMOL/L (ref 96–108)
CO2 SERPL-SCNC: 26 MMOL/L (ref 21–32)
CREAT SERPL-MCNC: 0.52 MG/DL (ref 0.6–1.3)
ERYTHROCYTE [DISTWIDTH] IN BLOOD BY AUTOMATED COUNT: 12.5 % (ref 11.6–15.1)
GFR SERPL CREATININE-BSD FRML MDRD: 101 ML/MIN/1.73SQ M
GLUCOSE SERPL-MCNC: 127 MG/DL (ref 65–140)
HCT VFR BLD AUTO: 38 % (ref 34.8–46.1)
HGB BLD-MCNC: 12.6 G/DL (ref 11.5–15.4)
MAGNESIUM SERPL-MCNC: 2 MG/DL (ref 1.9–2.7)
MCH RBC QN AUTO: 31.3 PG (ref 26.8–34.3)
MCHC RBC AUTO-ENTMCNC: 33.2 G/DL (ref 31.4–37.4)
MCV RBC AUTO: 94 FL (ref 82–98)
PLATELET # BLD AUTO: 260 THOUSANDS/UL (ref 149–390)
PMV BLD AUTO: 8.9 FL (ref 8.9–12.7)
POTASSIUM SERPL-SCNC: 3.9 MMOL/L (ref 3.5–5.3)
RBC # BLD AUTO: 4.03 MILLION/UL (ref 3.81–5.12)
SODIUM SERPL-SCNC: 141 MMOL/L (ref 135–147)
WBC # BLD AUTO: 7.21 THOUSAND/UL (ref 4.31–10.16)

## 2024-03-16 PROCEDURE — 83735 ASSAY OF MAGNESIUM: CPT | Performed by: INTERNAL MEDICINE

## 2024-03-16 PROCEDURE — 85027 COMPLETE CBC AUTOMATED: CPT | Performed by: INTERNAL MEDICINE

## 2024-03-16 PROCEDURE — 99239 HOSP IP/OBS DSCHRG MGMT >30: CPT | Performed by: INTERNAL MEDICINE

## 2024-03-16 PROCEDURE — 80048 BASIC METABOLIC PNL TOTAL CA: CPT | Performed by: INTERNAL MEDICINE

## 2024-03-16 RX ORDER — CEPHALEXIN 500 MG/1
500 CAPSULE ORAL EVERY 12 HOURS SCHEDULED
Qty: 14 CAPSULE | Refills: 0 | Status: SHIPPED | OUTPATIENT
Start: 2024-03-16 | End: 2024-03-23

## 2024-03-16 RX ADMIN — ACETAMINOPHEN 650 MG: 325 TABLET ORAL at 08:24

## 2024-03-16 RX ADMIN — Medication 250 MG: at 08:25

## 2024-03-16 RX ADMIN — SODIUM CHLORIDE 100 ML/HR: 0.9 INJECTION, SOLUTION INTRAVENOUS at 12:11

## 2024-03-16 RX ADMIN — CEFTRIAXONE 1000 MG: 1 INJECTION, SOLUTION INTRAVENOUS at 12:10

## 2024-03-16 RX ADMIN — SODIUM CHLORIDE 100 ML/HR: 0.9 INJECTION, SOLUTION INTRAVENOUS at 03:23

## 2024-03-16 NOTE — PROGRESS NOTES
[facility]  Progress Note  Name: Fe Morataya I  MRN: [id number]  Unit/Bed#: 2 [address] Date of Admission: 3/15/2024   Date of Service: 3/16/2024 I Hospital Day: 0    Assessment/Plan   * Ureteral calculus  Assessment & Plan  Presents to the ED with dysuria and left flank pain that started about 2 days ago.  She denies any associated fever/chills, chest pain, abdominal pain, nausea or vomiting.  CT with evidence of 3mm calculus at the left ureterovesicular junction  UA positive for infection- started on IV ceftriaxone  S/p cystoscopy with stone extraction and stent placement 3/15/24  KUB pending  Consult to Urology, recommendations are appreciated    UTI (urinary tract infection)  Assessment & Plan  UA positive for leukocytes and nitrites  Does not meet sepsis criteria  Started on IV ceftriaxone  Follow up urine culture    Smoking  Assessment & Plan  Current social smoker  Denies any nicotine supplementation    Hypomagnesemia-resolved as of 3/16/2024  Assessment & Plan  Magnesium 1.8 on admission  Supplementation with 2g IV mag  Magnesium improved 2.0 on 3/16/24  Resolved             VTE Pharmacologic Prophylaxis: VTE Score: 4 Moderate Risk (Score 3-4) - Pharmacological DVT Prophylaxis Ordered: heparin.    Mobility:   Basic Mobility Inpatient Raw Score: 24  JH-HLM Goal: 8: Walk 250 feet or more  JH-HLM Achieved: 6: Walk 10 steps or more  JH-HLM Goal achieved. Continue to encourage appropriate mobility.    Patient Centered Rounds: I performed bedside rounds with nursing staff today.   Discussions with Specialists or Other Care Team Provider: Nursing, Urology    Education and Discussions with Family / Patient: Updated  () at bedside.    Total Time Spent on Date of Encounter in care of patient: 45 mins. This time was spent on one or more of the following: performing physical exam; counseling and coordination of care; obtaining or reviewing history; documenting in the  medical record; reviewing/ordering tests, medications or procedures; communicating with other healthcare professionals and discussing with patient's family/caregivers.    Current Length of Stay: 0 day(s)  Current Patient Status: Observation   Certification Statement: The patient will continue to require additional inpatient hospital stay due to ureteral calculus, complicated UTI  Discharge Plan: Anticipate discharge in 24-48 hrs to home.    Code Status: Level 1 - Full Code    Subjective:   Patient reports improvement in her flank pain today. Still does have some dysuria this morning. Denies any fever or chills.     Objective:     Vitals:   Temp (24hrs), Av.9 °F (36.6 °C), Min:97.5 °F (36.4 °C), Max:98.4 °F (36.9 °C)    Temp:  [97.5 °F (36.4 °C)-98.4 °F (36.9 °C)] 98.4 °F (36.9 °C)  HR:  [68-96] 87  Resp:  [14-20] 18  BP: (103-147)/(56-84) 103/66  SpO2:  [92 %-97 %] 94 %  Body mass index is 19.51 kg/m².     Input and Output Summary (last 24 hours):     Intake/Output Summary (Last 24 hours) at 3/16/2024 1230  Last data filed at 3/16/2024 1216  Gross per 24 hour   Intake 1390 ml   Output 1200 ml   Net 190 ml       Physical Exam:   Physical Exam  Vitals and nursing note reviewed.   Constitutional:       General: She is not in acute distress.     Appearance: She is well-developed.   HENT:      Head: Normocephalic and atraumatic.   Eyes:      Conjunctiva/sclera: Conjunctivae normal.   Cardiovascular:      Rate and Rhythm: Normal rate and regular rhythm.      Heart sounds: No murmur heard.  Pulmonary:      Effort: Pulmonary effort is normal. No respiratory distress.      Breath sounds: Normal breath sounds.   Abdominal:      Palpations: Abdomen is soft.      Tenderness: There is no abdominal tenderness.   Musculoskeletal:         General: No swelling.      Cervical back: Neck supple.   Skin:     General: Skin is warm and dry.      Capillary Refill: Capillary refill takes less than 2 seconds.   Neurological:      Mental  Status: She is alert.   Psychiatric:         Mood and Affect: Mood normal.          Additional Data:     Labs:  Results from last 7 days   Lab Units 03/16/24  0522 03/15/24  1010   WBC Thousand/uL 7.21 13.55*   HEMOGLOBIN g/dL 12.6 13.9   HEMATOCRIT % 38.0 42.4   PLATELETS Thousands/uL 260 273   NEUTROS PCT %  --  71   LYMPHS PCT %  --  22   MONOS PCT %  --  6   EOS PCT %  --  1     Results from last 7 days   Lab Units 03/16/24  0522 03/15/24  1010   SODIUM mmol/L 141 134*   POTASSIUM mmol/L 3.9 4.0   CHLORIDE mmol/L 108 111*   CO2 mmol/L 26 28   BUN mg/dL 14 20   CREATININE mg/dL 0.52* 0.77   ANION GAP mmol/L 7 -5*   CALCIUM mg/dL 8.3* 9.2   ALBUMIN g/dL  --  4.3   TOTAL BILIRUBIN mg/dL  --  0.75   ALK PHOS U/L  --  69   ALT U/L  --  9   AST U/L  --  13   GLUCOSE RANDOM mg/dL 127 102                 Results from last 7 days   Lab Units 03/15/24  1252   LACTIC ACID mmol/L 0.6       Lines/Drains:  Invasive Devices       Peripheral Intravenous Line  Duration             Peripheral IV 03/15/24 Right Antecubital 1 day              Drain  Duration             Ureteral Internal Stent Left ureter 6 Fr. <1 day                          Imaging: No pertinent imaging reviewed.    Recent Cultures (last 7 days):   Results from last 7 days   Lab Units 03/15/24  1252   BLOOD CULTURE  Received in Microbiology Lab. Culture in Progress.  Received in Microbiology Lab. Culture in Progress.       Last 24 Hours Medication List:   Current Facility-Administered Medications   Medication Dose Route Frequency Provider Last Rate    acetaminophen  650 mg Oral Q6H PRN Vicki Thrasher PA-C      aluminum-magnesium hydroxide-simethicone  30 mL Oral Q6H PRN Vicki Thrasher PA-C      cefTRIAXone  1,000 mg Intravenous Q24H Vicki Thrasher PA-C 1,000 mg (03/16/24 1210)    heparin (porcine)  5,000 Units Subcutaneous Q8H [facility] Vicki Thrasher PA-C      ondansetron  4 mg Intravenous Q6H PRN Vicki Thrasher PA-C      saccharomyces boulardii  250 mg Oral Daily Vicki  TATY Thrasher      sodium chloride  1,000 mL Intravenous Once PRN Geoffrey Henry MD      And    sodium chloride  1,000 mL Intravenous Once PRN Geoffrey Henry MD      sodium chloride  100 mL/hr Intravenous Continuous Vicki Thrasher PA-C 100 mL/hr (03/16/24 1211)        Today, Patient Was Seen By: Vicki Thrasher PA-C    **Please Note: This note may have been constructed using a voice recognition system.**

## 2024-03-16 NOTE — ASSESSMENT & PLAN NOTE
Magnesium 1.8 on admission  Supplementation with 2g IV mag  Magnesium improved 2.0 on 3/16/24  Resolved

## 2024-03-16 NOTE — UTILIZATION REVIEW
Initial Clinical Review    Admission: Date/Time/Statement:   Admission Orders (From admission, onward)       Ordered        03/15/24 1220  Place in Observation  Once                     Orders Placed This Encounter   Procedures    Place in Observation     Standing Status:   Standing     Number of Occurrences:   1     Order Specific Question:   Level of Care     Answer:   Med Surg [16]     ED Arrival Information       Expected   -    Arrival   3/15/2024 09:42    Acuity   -              Means of arrival   Walk-In    Escorted by   Self    Service   Hospitalist    Admission type   Emergency              Arrival complaint   abdominal lpain          Chief Complaint   Patient presents with    Flank Pain     Patient states yesterday started with urinary frequency/presssure - started herself on Azo.  Then started with left flank and back pain.     Initial Presentation:  62 y/o female presented to [facility] ED on 3/15/24 2nd acute onset left flank pain with CT scan showing 3 mm left distal ureterovesical junction stone.  Also reports voiding issues with possible complicated UTI   Placed in Observation 3/15/24 at 1220 - plan antibiotics, pain control, Urology management  Ib ED - Temp 98.4   Exam: left CVA tenderness  CT shows mm calculus at the left ureterovesicular junction with minimal ectasia of the ureter and renal pelvis. Multiple additional small nonobstructing renal calculi bilaterally.  Labs: WBC 13,550,  UA +Nitrite, Blood, WBC   ED Tx: IVF NSS,  IV Rocephin, IV Toradol  Placed in Observation 3/15/24 at 1220 -     Assessment    Ureteral calculus    Smoking    UTI (urinary tract infection)    Hypomagnesemia     Plan  Ureteral calculus.  For cystoscopy today.  Continue IV fluids.  UTI.  Evidence of leukocytes and nitrites on UA..  Ceftriaxone.  Follow-up on urine culture.  Hypomagnesemia.  Replace and recheck tomorrow.      3/15/24 UROLOGY:  Assessment/ Plan:  Complicated UTI?  Left distal 3 mm vesicle junction stone  has a 98% chance percent chance of passing with medical expulsion therapy  In light of hydronephrosis and complicated UTI would schedule urgent cystoscopy left stent tonight      3/15/24 OPERATIVE REPORT:  Preop + Post-Op Diagnosis Codes:  Ureteral stone with hydronephrosis [N13.2]   Procedure(s): Left - CYSTOSCOPY URETEROSCOPY. BASKET STONE EXTRACTION AND INSERTION STENT URETERAL  Anesthesia Type:  General/LMA   Operative Indications: Ureteral stone with hydronephrosis [N13.2]  This 63-year-old female presented to [facility] ER with acute onset left lower quadrant left flank pain found on CAT scan to have a 3 mm left distal ureterovesical junction stone questionable UTI admitted for analgesics seen at the bedside now in light of possible UTI to undergo cystoscopy stent possible ureteroscopy stone extraction aware risk of anesthesia infection bleeding additional Yannes procedures   Operative Findings:  3 mm stone noted in the orifice mild left ureteroscopy basket extraction 24 cm 6 [place] stent passed     3/16/24 - DAY 2:          ED Triage Vitals   Temperature Pulse Respirations Blood Pressure SpO2   03/15/24 1000 03/15/24 1000 03/15/24 1000 03/15/24 1000 03/15/24 1000   98.4 °F (36.9 °C) 76 18 139/76 95 %      Temp Source Heart Rate Source Patient Position - Orthostatic VS BP Location FiO2 (%)   03/15/24 1000 03/15/24 1000 03/15/24 1000 03/15/24 1000 --   Oral Monitor Lying Left arm       Pain Score       03/15/24 1013       2          Wt Readings from Last 1 Encounters:   03/15/24 58.2 kg (128 lb 4.8 oz)     Additional Vital Signs:         Pertinent Labs/Diagnostic Test Results:   CT renal stone study abdomen pelvis wo contrast   Final Result by Rupesh Beebe MD (03/15 1129)   3 mm calculus at the left ureterovesicular junction with minimal ectasia of the ureter and renal pelvis. Multiple additional small nonobstructing renal calculi bilaterally.         Results from last 7 days   Lab Units 03/16/24  2777  03/15/24  1010   WBC Thousand/uL 7.21 13.55*   HEMOGLOBIN g/dL 12.6 13.9   HEMATOCRIT % 38.0 42.4   PLATELETS Thousands/uL 260 273   NEUTROS ABS Thousands/µL  --  9.55*         Results from last 7 days   Lab Units 03/16/24  0522 03/15/24  1010   SODIUM mmol/L 141 134*   POTASSIUM mmol/L 3.9 4.0   CHLORIDE mmol/L 108 111*   CO2 mmol/L 26 28   ANION GAP mmol/L 7 -5*   BUN mg/dL 14 20   CREATININE mg/dL 0.52* 0.77   EGFR ml/min/1.73sq m 101 81   CALCIUM mg/dL 8.3* 9.2   MAGNESIUM mg/dL 2.0 1.8*     Results from last 7 days   Lab Units 03/15/24  1010   AST U/L 13   ALT U/L 9   ALK PHOS U/L 69   TOTAL PROTEIN g/dL 6.8   ALBUMIN g/dL 4.3   TOTAL BILIRUBIN mg/dL 0.75         Results from last 7 days   Lab Units 03/16/24  0522 03/15/24  1010   GLUCOSE RANDOM mg/dL 127 102       Results from last 7 days   Lab Units 03/15/24  1252   LACTIC ACID mmol/L 0.6       Results from last 7 days   Lab Units 03/15/24  1010   LIPASE u/L 9*     Results from last 7 days   Lab Units 03/15/24  1010   CLARITY UA  Clear   COLOR UA  Orange   SPEC GRAV UA  1.025   PH UA  5.0   GLUCOSE UA mg/dl 100 (1/10%)*   KETONES UA mg/dl Negative   BLOOD UA  Moderate*   PROTEIN UA mg/dl 100 (2+)*   NITRITE UA  Positive*   BILIRUBIN UA  Small*   UROBILINOGEN UA E.U./dl 4.0*   LEUKOCYTES UA  Trace*   WBC UA /hpf 4-10*   RBC UA /hpf 10-20*   BACTERIA UA /hpf Occasional   EPITHELIAL CELLS WET PREP /hpf Occasional   MUCUS THREADS  Occasional*     Results from last 7 days   Lab Units 03/15/24  1252   BLOOD CULTURE  Received in Microbiology Lab. Culture in Progress.  Received in Microbiology Lab. Culture in Progress.     ED Treatment:   Medication Administration from 03/15/2024 0941 to 03/15/2024 1338         Date/Time Order Dose Route Action     03/15/2024 1243 EDT sodium chloride 0.9 % bolus 1,000 mL 0 mL Intravenous Stopped     03/15/2024 1010 EDT sodium chloride 0.9 % bolus 1,000 mL 1,000 mL Intravenous New Bag     03/15/2024 1013 EDT ketorolac (TORADOL)  injection 15 mg 15 mg Intravenous Given     03/15/2024 1241 EDT cefTRIAXone (ROCEPHIN) IVPB (premix in dextrose) 1,000 mg 50 mL 0 mg Intravenous Stopped     03/15/2024 1123 EDT cefTRIAXone (ROCEPHIN) IVPB (premix in dextrose) 1,000 mg 50 mL 1,000 mg Intravenous New Bag       Past Medical History:   Diagnosis Date    Breast lump     Cancer (HCC)     ca of upper lip recently     Present on Admission:   Smoking    Admitting Diagnosis: Ureteral calculus [N20.1]  Ureteral stone [N20.1]  Pyelonephritis [N12]  Flank pain [R10.9]  Ureteral stone with hydronephrosis [N13.2]    Age/Sex: 63 y.o. female    Admission Orders:      Scheduled Medications:  IV cefTRIAXone, 1,000 mg, Intravenous, Q24H  heparin (porcine), 5,000 Units, Subcutaneous, Q8H SANJUANITA  saccharomyces boulardii, 250 mg, Oral, Daily    Continuous IV Infusions:  IVF sodium chloride, 100 mL/hr, Intravenous, Continuous    PRN Meds:  acetaminophen, 650 mg, Oral, Q6H PRN  aluminum-magnesium hydroxide-simethicone, 30 mL, Oral, Q6H PRN  HYDROmorphone, 0.2 mg, Intravenous, Q3H PRN  ondansetron, 4 mg, Intravenous, Q6H PRN  oxyCODONE, 10 mg, Oral, Q6H PRN  oxyCODONE, 5 mg, Oral, Q6H PRN  sodium chloride, 1,000 mL, Intravenous, Once PRN   And  sodium chloride, 1,000 mL, Intravenous, Once PRN    IP CONSULT TO UROLOGY    Network Utilization Review Department  ATTENTION: Please call with any questions or concerns to [phone] and carefully listen to the prompts so that you are directed to the right person. All voicemails are confidential.   For Discharge needs, contact Care Management DC Support Team at [phone] opt. 2  Send all requests for admission clinical reviews, approved or denied determinations and any other requests to dedicated fax number below belonging to the campus where the patient is receiving treatment. List of dedicated fax numbers for the Facilities:  FACILITY NAME UR FAX NUMBER   ADMISSION DENIALS (Administrative/Medical Necessity) [phone]    DISCHARGE SUPPORT TEAM (NETWORK) [phone]   PARENT CHILD HEALTH (Maternity/NICU/Pediatrics) [phone]   [facility] [phone]   [facility] [phone]   [facility] [phone]   [facility] [phone]   [facility] [phone]   [facility] [phone]   [facility] [phone]   [facility] [phone]   [facility] [phone]   [facility] [phone]   [facility] [phone]   [facility] [phone]

## 2024-03-16 NOTE — PLAN OF CARE
Problem: PAIN - ADULT  Goal: Verbalizes/displays adequate comfort level or baseline comfort level  Description: Interventions:  - Encourage patient to monitor pain and request assistance  - Assess pain using appropriate pain scale  - Administer analgesics based on type and severity of pain and evaluate response  - Implement non-pharmacological measures as appropriate and evaluate response  - Consider cultural and social influences on pain and pain management  - Notify physician/advanced practitioner if interventions unsuccessful or patient reports new pain  Outcome: Progressing     Problem: INFECTION - ADULT  Goal: Absence or prevention of progression during hospitalization  Description: INTERVENTIONS:  - Assess and monitor for signs and symptoms of infection  - Monitor lab/diagnostic results  - Monitor all insertion sites, i.e. indwelling lines, tubes, and drains  - Monitor endotracheal if appropriate and nasal secretions for changes in amount and color  - [place] appropriate cooling/warming therapies per order  - Administer medications as ordered  - Instruct and encourage patient and family to use good hand hygiene technique  - Identify and instruct in appropriate isolation precautions for identified infection/condition  Outcome: Progressing     Problem: DISCHARGE PLANNING  Goal: Discharge to home or other facility with appropriate resources  Description: INTERVENTIONS:  - Identify barriers to discharge w/patient and caregiver  - Arrange for needed discharge resources and transportation as appropriate  - Identify discharge learning needs (meds, wound care, etc.)  - Arrange for interpretive services to assist at discharge as needed  - Refer to Case Management Department for coordinating discharge planning if the patient needs post-hospital services based on physician/advanced practitioner order or complex needs related to functional status, cognitive ability, or social support system  Outcome: Progressing      Problem: Knowledge Deficit  Goal: Patient/family/caregiver demonstrates understanding of disease process, treatment plan, medications, and discharge instructions  Description: Complete learning assessment and assess knowledge base.  Interventions:  - Provide teaching at level of understanding  - Provide teaching via preferred learning methods  Outcome: Progressing     Problem: GENITOURINARY - ADULT  Goal: Maintains or returns to baseline urinary function  Description: INTERVENTIONS:  - Assess urinary function  - Encourage oral fluids to ensure adequate hydration if ordered  - Administer IV fluids as ordered to ensure adequate hydration  - Administer ordered medications as needed  - Offer frequent toileting  - Follow urinary retention protocol if ordered  Outcome: Progressing  Goal: Absence of urinary retention  Description: INTERVENTIONS:  - Assess patient’s ability to void and empty bladder  - Monitor I/O  - Bladder scan as needed  - Discuss with physician/AP medications to alleviate retention as needed  - Discuss catheterization for long term situations as appropriate  Outcome: Progressing

## 2024-03-16 NOTE — ASSESSMENT & PLAN NOTE
Presents to the ED with dysuria and left flank pain that started about 2 days ago.  She denies any associated fever/chills, chest pain, abdominal pain, nausea or vomiting.  CT with evidence of 3mm calculus at the left ureterovesicular junction  UA positive for infection. Started on IV ceftriaxone (D2). Will discharge on oral course Keflex.  S/p cystoscopy with stone extraction and stent placement 3/15/24  Consult to Urology, recommendations are appreciated. Stable for discharge with follow up in the office for stent removal next  week.

## 2024-03-16 NOTE — ASSESSMENT & PLAN NOTE
Presents to the ED with dysuria and left flank pain that started about 2 days ago.  She denies any associated fever/chills, chest pain, abdominal pain, nausea or vomiting.  CT with evidence of 3mm calculus at the left ureterovesicular junction  UA positive for infection- started on IV ceftriaxone  S/p cystoscopy with stone extraction and stent placement 3/15/24  KUB pending  Consult to Urology, recommendations are appreciated

## 2024-03-16 NOTE — PROGRESS NOTES
"Progress Note - Urology      Patient: Fe Morataya   : 1960 Sex: female   MRN: [id number]     CSN: [id number]  Unit/Bed#: [address] [place] [address]     SUBJECTIVE:   Patient seen on afternoon rounds  Noting some frequency urgency  Status post cystoscopy left ureteroscopy stone extraction day #1  Cultures pending      Objective   Vitals: /66   Pulse 87   Temp 98.4 °F (36.9 °C)   Resp 18   Ht 5' 8\" (1.727 m)   Wt 58.2 kg (128 lb 4.8 oz)   SpO2 94%   BMI 19.51 kg/m²     I/O last 24 hours:  In: 1390 [I.V.:1340; IV Piggyback:50]  Out: 1200 [Urine:1200]      Physical Exam:   General Alert awake   Normocephalic atraumatic PERRLA  Lungs clear bilaterally  Cardiac normal S1 normal S2  Abdomen soft, flank pain  Extremities no edema      Lab Results: CBC:   Lab Results   Component Value Date    WBC 7.21 2024    HGB 12.6 2024    HCT 38.0 2024    MCV 94 2024     2024    RBC 4.03 2024    MCH 31.3 2024    MCHC 33.2 2024    RDW 12.5 2024    MPV 8.9 2024    NRBC 0 03/15/2024     CMP:   Lab Results   Component Value Date     2024    CO2 26 2024    BUN 14 2024    CREATININE 0.52 (L) 2024    CALCIUM 8.3 (L) 2024    AST 13 03/15/2024    ALT 9 03/15/2024    ALKPHOS 69 03/15/2024    EGFR 101 2024     Urinalysis:   Lab Results   Component Value Date    COLORU Orange 03/15/2024    CLARITYU Clear 03/15/2024    SPECGRAV 1.025 03/15/2024    PHUR 5.0 03/15/2024    LEUKOCYTESUR Trace (A) 03/15/2024    NITRITE Positive (A) 03/15/2024    GLUCOSEU 100 (1/10%) (A) 03/15/2024    KETONESU Negative 03/15/2024    BILIRUBINUR Small (A) 03/15/2024    BLOODU Moderate (A) 03/15/2024     Urine Culture: No results found for: \"URINECX\"  PSA: No results found for: \"PSA\"      Assessment/ Plan:  Status post cystoscopy left ureteroscopy basket extraction and stent placement day #1  Urine culture pending  Patient feels good  Can be discharged home on " Keflex and changed pending cultures as outpatient          Geoffrey Henry MD

## 2024-03-16 NOTE — ASSESSMENT & PLAN NOTE
UA positive for leukocytes and nitrites  Does not meet sepsis criteria  Received IV ceftriaxone (D2)  Discharge on oral course of Keflex  Follow up urine culture

## 2024-03-16 NOTE — DISCHARGE SUMMARY
[facility]  Discharge- Fe Morataya 1960, 63 y.o. female MRN: [id number]  Unit/Bed#: [address] [place] [address] Encounter: [id number]  Primary Care Provider: No primary care provider on file.   Date and time admitted to hospital: 3/15/2024  9:50 AM    * Ureteral calculus  Assessment & Plan  Presents to the ED with dysuria and left flank pain that started about 2 days ago.  She denies any associated fever/chills, chest pain, abdominal pain, nausea or vomiting.  CT with evidence of 3mm calculus at the left ureterovesicular junction  UA positive for infection. Started on IV ceftriaxone (D2). Will discharge on oral course Keflex.  S/p cystoscopy with stone extraction and stent placement 3/15/24  Consult to Urology, recommendations are appreciated. Stable for discharge with follow up in the office for stent removal next  week.     UTI (urinary tract infection)  Assessment & Plan  UA positive for leukocytes and nitrites  Does not meet sepsis criteria  Received IV ceftriaxone (D2)  Discharge on oral course of Keflex  Follow up urine culture    Smoking  Assessment & Plan  Current social smoker  Denies any nicotine supplementation        Medical Problems       Resolved Problems  Date Reviewed: 3/16/2024   None       Discharging Physician / Practitioner: Vicki Thrasher PA-C  PCP: No primary care provider on file.  Admission Date:   Admission Orders (From admission, onward)       Ordered        03/15/24 1220  Place in Observation  Once                          Discharge Date: 03/16/24    Consultations During Hospital Stay:  Urology    Procedures Performed:   Cystoscopy with stone extraction and stent placement 3/15/24    Significant Findings / Test Results:   CT renal stone A/P (3/15/24):  3 mm calculus at the left ureterovesicular junction with minimal ectasia of the ureter and renal pelvis. Multiple additional small nonobstructing renal calculi bilaterally.    Incidental Findings:   None    Test Results Pending  "at Discharge (will require follow up):   KUB     Outpatient Tests Requested:  None    Complications:  None    Reason for Admission: Ureteral calculus    Hospital Course:   Fe Morataya is a 63 y.o. female patient who originally presented to the hospital on 3/15/2024 due to dysuria and left flank pain.  Patient underwent CT scan which showed 3 mm calculus at the left ureterovesicular junction.  Patient also had a UA positive for leukocytes and nitrites.  Patient was admitted to the hospital with urology consultation.  Patient underwent cystoscopy with stone extraction and stent placement on 3/15/2024.  Patient received 2 days of IV ceftriaxone.  Patient feels well today and remains afebrile without any leukocytosis.  Cleared by urology for discharge today.  Will plan for discharge on oral course of Keflex x 7 days.  Will follow-up on urine culture and blood culture results.  Patient to follow-up in urology office next week for stent removal.  All patient questions answered to the best of my ability.      Please see above list of diagnoses and related plan for additional information.     Condition at Discharge: stable    Discharge Day Visit / Exam:   Subjective:  Patient feels well today, eager for discharge    Vitals: Blood Pressure: 103/66 (03/16/24 0819)  Pulse: 87 (03/16/24 0819)  Temperature: 98.4 °F (36.9 °C) (03/16/24 0819)  Temp Source: Oral (03/15/24 2203)  Respirations: 18 (03/16/24 0230)  Height: 5' 8\" (172.7 cm) (03/15/24 1344)  Weight - Scale: 58.2 kg (128 lb 4.8 oz) (03/15/24 1344)  SpO2: 94 % (03/16/24 0819)  Exam:   Physical Exam  Vitals and nursing note reviewed.   Constitutional:       General: She is not in acute distress.     Appearance: She is well-developed.   HENT:      Head: Normocephalic and atraumatic.   Eyes:      Conjunctiva/sclera: Conjunctivae normal.   Cardiovascular:      Rate and Rhythm: Normal rate and regular rhythm.      Heart sounds: No murmur heard.  Pulmonary:      Effort: " Pulmonary effort is normal. No respiratory distress.      Breath sounds: Normal breath sounds.   Abdominal:      Palpations: Abdomen is soft.      Tenderness: There is no abdominal tenderness.   Musculoskeletal:         General: No swelling.      Cervical back: Neck supple.   Skin:     General: Skin is warm and dry.      Capillary Refill: Capillary refill takes less than 2 seconds.   Neurological:      Mental Status: She is alert.   Psychiatric:         Mood and Affect: Mood normal.          Discussion with Family: Updated  () at bedside.    Discharge instructions/Information to patient and family:   See after visit summary for information provided to patient and family.      Provisions for Follow-Up Care:  See after visit summary for information related to follow-up care and any pertinent home health orders.      Mobility at time of Discharge:   Basic Mobility Inpatient Raw Score: 24  JH-HLM Goal: 8: Walk 250 feet or more  JH-HLM Achieved: 6: Walk 10 steps or more  HLM Goal achieved. Continue to encourage appropriate mobility.     Disposition:   Home    Planned Readmission: None     Discharge Statement:  I spent 55 minutes discharging the patient. This time was spent on the day of discharge. I had direct contact with the patient on the day of discharge. Greater than 50% of the total time was spent examining patient, answering all patient questions, arranging and discussing plan of care with patient as well as directly providing post-discharge instructions.  Additional time then spent on discharge activities.    Discharge Medications:  See after visit summary for reconciled discharge medications provided to patient and/or family.      **Please Note: This note may have been constructed using a voice recognition system**

## 2024-03-19 PROCEDURE — 88300 SURGICAL PATH GROSS: CPT | Performed by: PATHOLOGY

## 2024-03-20 LAB
BACTERIA BLD CULT: NORMAL
BACTERIA BLD CULT: NORMAL

## 2024-03-26 LAB
CALCIUM OXALATE DIHYDRATE MFR STONE IR: 70 %
COLOR STONE: NORMAL
COM MFR STONE: 30 %
COMMENT-STONE3: NORMAL
COMPOSITION: NORMAL
LABORATORY COMMENT REPORT: NORMAL
PHOTO: NORMAL
SIZE STONE: NORMAL MM
SPEC SOURCE SUBJ: NORMAL
STONE ANALYSIS-IMP: NORMAL
STONE ANALYSIS-IMP: NORMAL
WT STONE: 11 MG

## 2024-04-30 ENCOUNTER — APPOINTMENT (OUTPATIENT)
Dept: LAB | Facility: HOSPITAL | Age: 64
End: 2024-04-30

## 2024-05-01 PROBLEM — N39.0 UTI (URINARY TRACT INFECTION): Status: RESOLVED | Noted: 2024-03-15 | Resolved: 2024-05-01

## 2024-05-06 ENCOUNTER — HOSPITAL ENCOUNTER (OUTPATIENT)
Dept: RADIOLOGY | Facility: HOSPITAL | Age: 64
Discharge: HOME | End: 2024-05-06
Attending: SPECIALIST
Payer: COMMERCIAL

## 2024-05-06 DIAGNOSIS — N20.0 CALCULUS OF KIDNEY: ICD-10-CM

## 2024-05-06 PROCEDURE — 76775 US EXAM ABDO BACK WALL LIM: CPT

## 2024-05-11 ENCOUNTER — APPOINTMENT (OUTPATIENT)
Dept: LAB | Facility: HOSPITAL | Age: 64
End: 2024-05-11
Attending: SPECIALIST
Payer: COMMERCIAL

## 2024-05-11 DIAGNOSIS — C66.9 MALIGNANT NEOPLASM OF URETER, UNSPECIFIED LATERALITY (HCC): ICD-10-CM

## 2024-05-11 LAB
CALCIUM 24H UR-MCNC: 188.5 MG/24 HRS (ref 100–300)
PERIOD: 24 HOURS
PERIOD: 24 HOURS
SODIUM 24H UR-SRATE: 106.6 MMOL/24 HRS (ref 40–220)
SPECIMEN VOL UR: 1300 ML
URATE 24H UR-MCNC: 375.7 MG/24 HRS (ref 250–750)

## 2024-05-11 PROCEDURE — 83945 ASSAY OF OXALATE: CPT

## 2024-05-11 PROCEDURE — 82340 ASSAY OF CALCIUM IN URINE: CPT

## 2024-05-11 PROCEDURE — 84560 ASSAY OF URINE/URIC ACID: CPT

## 2024-05-11 PROCEDURE — 84300 ASSAY OF URINE SODIUM: CPT

## 2024-05-11 PROCEDURE — 82507 ASSAY OF CITRATE: CPT

## 2024-05-13 LAB
CITRATE 24H UR-MCNC: 338 MG/L
CITRATE 24H UR-MRATE: 439 MG/24 HR (ref 320–1240)
OXALATE 24H UR-MRATE: 18 MG/24 HR (ref 4–31)
OXALATE UR-MCNC: 14 MG/L

## 2025-01-05 ENCOUNTER — HOSPITAL ENCOUNTER (EMERGENCY)
Facility: HOSPITAL | Age: 65
Discharge: HOME | End: 2025-01-05
Attending: STUDENT IN AN ORGANIZED HEALTH CARE EDUCATION/TRAINING PROGRAM
Payer: MEDICARE

## 2025-01-05 ENCOUNTER — APPOINTMENT (EMERGENCY)
Dept: RADIOLOGY | Facility: HOSPITAL | Age: 65
End: 2025-01-05
Payer: MEDICARE

## 2025-01-05 VITALS
OXYGEN SATURATION: 96 % | RESPIRATION RATE: 18 BRPM | HEIGHT: 68 IN | DIASTOLIC BLOOD PRESSURE: 68 MMHG | BODY MASS INDEX: 18.49 KG/M2 | WEIGHT: 122 LBS | HEART RATE: 62 BPM | SYSTOLIC BLOOD PRESSURE: 111 MMHG | TEMPERATURE: 98.2 F

## 2025-01-05 DIAGNOSIS — R11.0 NAUSEA: ICD-10-CM

## 2025-01-05 DIAGNOSIS — N23 RENAL COLIC: ICD-10-CM

## 2025-01-05 DIAGNOSIS — R10.9 RIGHT FLANK PAIN: Primary | ICD-10-CM

## 2025-01-05 DIAGNOSIS — N13.1 HYDRONEPHROSIS DUE TO OBSTRUCTION OF URETER: ICD-10-CM

## 2025-01-05 LAB
ALBUMIN SERPL BCG-MCNC: 4.2 G/DL (ref 3.5–5)
ALP SERPL-CCNC: 76 U/L (ref 34–104)
ALT SERPL W P-5'-P-CCNC: 8 U/L (ref 7–52)
ANION GAP SERPL CALCULATED.3IONS-SCNC: 1 MMOL/L (ref 4–13)
AST SERPL W P-5'-P-CCNC: 11 U/L (ref 13–39)
BACTERIA UR QL AUTO: ABNORMAL /HPF
BASOPHILS # BLD AUTO: 0.03 THOUSANDS/ΜL (ref 0–0.1)
BASOPHILS NFR BLD AUTO: 0 % (ref 0–1)
BILIRUB SERPL-MCNC: 0.63 MG/DL (ref 0.2–1)
BILIRUB UR QL STRIP: NEGATIVE
BUN SERPL-MCNC: 22 MG/DL (ref 5–25)
CALCIUM SERPL-MCNC: 9.3 MG/DL (ref 8.4–10.2)
CHLORIDE SERPL-SCNC: 107 MMOL/L (ref 96–108)
CLARITY UR: CLEAR
CO2 SERPL-SCNC: 30 MMOL/L (ref 21–32)
COLOR UR: YELLOW
CREAT SERPL-MCNC: 0.75 MG/DL (ref 0.6–1.3)
EOSINOPHIL # BLD AUTO: 0.16 THOUSAND/ΜL (ref 0–0.61)
EOSINOPHIL NFR BLD AUTO: 1 % (ref 0–6)
ERYTHROCYTE [DISTWIDTH] IN BLOOD BY AUTOMATED COUNT: 13.2 % (ref 11.6–15.1)
GFR SERPL CREATININE-BSD FRML MDRD: 83 ML/MIN/1.73SQ M
GLUCOSE SERPL-MCNC: 103 MG/DL (ref 65–140)
GLUCOSE UR STRIP-MCNC: NEGATIVE MG/DL
HCT VFR BLD AUTO: 44.2 % (ref 34.8–46.1)
HGB BLD-MCNC: 14.4 G/DL (ref 11.5–15.4)
HGB UR QL STRIP.AUTO: ABNORMAL
IMM GRANULOCYTES # BLD AUTO: 0.04 THOUSAND/UL (ref 0–0.2)
IMM GRANULOCYTES NFR BLD AUTO: 0 % (ref 0–2)
KETONES UR STRIP-MCNC: NEGATIVE MG/DL
LEUKOCYTE ESTERASE UR QL STRIP: ABNORMAL
LYMPHOCYTES # BLD AUTO: 2.84 THOUSANDS/ΜL (ref 0.6–4.47)
LYMPHOCYTES NFR BLD AUTO: 20 % (ref 14–44)
MCH RBC QN AUTO: 30.6 PG (ref 26.8–34.3)
MCHC RBC AUTO-ENTMCNC: 32.6 G/DL (ref 31.4–37.4)
MCV RBC AUTO: 94 FL (ref 82–98)
MONOCYTES # BLD AUTO: 0.91 THOUSAND/ΜL (ref 0.17–1.22)
MONOCYTES NFR BLD AUTO: 6 % (ref 4–12)
NEUTROPHILS # BLD AUTO: 10.52 THOUSANDS/ΜL (ref 1.85–7.62)
NEUTS SEG NFR BLD AUTO: 73 % (ref 43–75)
NITRITE UR QL STRIP: NEGATIVE
NON-SQ EPI CELLS URNS QL MICRO: ABNORMAL /HPF
NRBC BLD AUTO-RTO: 0 /100 WBCS
PH UR STRIP.AUTO: 6.5 [PH]
PLATELET # BLD AUTO: 293 THOUSANDS/UL (ref 149–390)
PMV BLD AUTO: 8.5 FL (ref 8.9–12.7)
POTASSIUM SERPL-SCNC: 3.9 MMOL/L (ref 3.5–5.3)
PROT SERPL-MCNC: 6.6 G/DL (ref 6.4–8.4)
PROT UR STRIP-MCNC: NEGATIVE MG/DL
RBC # BLD AUTO: 4.71 MILLION/UL (ref 3.81–5.12)
RBC #/AREA URNS AUTO: ABNORMAL /HPF
SODIUM SERPL-SCNC: 138 MMOL/L (ref 135–147)
SP GR UR STRIP.AUTO: 1.02 (ref 1–1.03)
UROBILINOGEN UR STRIP-ACNC: <2 MG/DL
WBC # BLD AUTO: 14.5 THOUSAND/UL (ref 4.31–10.16)
WBC #/AREA URNS AUTO: ABNORMAL /HPF

## 2025-01-05 PROCEDURE — 96375 TX/PRO/DX INJ NEW DRUG ADDON: CPT

## 2025-01-05 PROCEDURE — 99284 EMERGENCY DEPT VISIT MOD MDM: CPT

## 2025-01-05 PROCEDURE — 80053 COMPREHEN METABOLIC PANEL: CPT | Performed by: PHYSICIAN ASSISTANT

## 2025-01-05 PROCEDURE — 85025 COMPLETE CBC W/AUTO DIFF WBC: CPT | Performed by: PHYSICIAN ASSISTANT

## 2025-01-05 PROCEDURE — 36415 COLL VENOUS BLD VENIPUNCTURE: CPT | Performed by: PHYSICIAN ASSISTANT

## 2025-01-05 PROCEDURE — 99285 EMERGENCY DEPT VISIT HI MDM: CPT | Performed by: PHYSICIAN ASSISTANT

## 2025-01-05 PROCEDURE — 96374 THER/PROPH/DIAG INJ IV PUSH: CPT

## 2025-01-05 PROCEDURE — 81001 URINALYSIS AUTO W/SCOPE: CPT | Performed by: PHYSICIAN ASSISTANT

## 2025-01-05 PROCEDURE — 74176 CT ABD & PELVIS W/O CONTRAST: CPT

## 2025-01-05 RX ORDER — ONDANSETRON 2 MG/ML
4 INJECTION INTRAMUSCULAR; INTRAVENOUS ONCE
Status: COMPLETED | OUTPATIENT
Start: 2025-01-05 | End: 2025-01-05

## 2025-01-05 RX ORDER — ONDANSETRON 4 MG/1
4 TABLET, ORALLY DISINTEGRATING ORAL EVERY 6 HOURS PRN
Qty: 6 TABLET | Refills: 0 | Status: SHIPPED | OUTPATIENT
Start: 2025-01-05

## 2025-01-05 RX ORDER — ACETAMINOPHEN 10 MG/ML
1000 INJECTION, SOLUTION INTRAVENOUS ONCE
Status: COMPLETED | OUTPATIENT
Start: 2025-01-05 | End: 2025-01-05

## 2025-01-05 RX ORDER — HYDROMORPHONE HCL/PF 1 MG/ML
0.5 SYRINGE (ML) INJECTION ONCE
Refills: 0 | Status: COMPLETED | OUTPATIENT
Start: 2025-01-05 | End: 2025-01-05

## 2025-01-05 RX ORDER — KETOROLAC TROMETHAMINE 30 MG/ML
15 INJECTION, SOLUTION INTRAMUSCULAR; INTRAVENOUS ONCE
Status: COMPLETED | OUTPATIENT
Start: 2025-01-05 | End: 2025-01-05

## 2025-01-05 RX ORDER — OMEGA-3S/DHA/EPA/FISH OIL/D3 300MG-1000
400 CAPSULE ORAL DAILY
COMMUNITY

## 2025-01-05 RX ORDER — TAMSULOSIN HYDROCHLORIDE 0.4 MG/1
0.4 CAPSULE ORAL
Qty: 10 CAPSULE | Refills: 0 | Status: SHIPPED | OUTPATIENT
Start: 2025-01-05 | End: 2025-01-15

## 2025-01-05 RX ADMIN — KETOROLAC TROMETHAMINE 15 MG: 30 INJECTION, SOLUTION INTRAMUSCULAR; INTRAVENOUS at 07:48

## 2025-01-05 RX ADMIN — ACETAMINOPHEN 1000 MG: 1000 INJECTION, SOLUTION INTRAVENOUS at 07:55

## 2025-01-05 RX ADMIN — HYDROMORPHONE HYDROCHLORIDE 0.5 MG: 1 INJECTION, SOLUTION INTRAMUSCULAR; INTRAVENOUS; SUBCUTANEOUS at 07:53

## 2025-01-05 RX ADMIN — ONDANSETRON 4 MG: 2 INJECTION INTRAMUSCULAR; INTRAVENOUS at 07:49

## 2025-01-05 NOTE — ED NOTES
Provider educated pt on medication to manage pain. Directed pt to come back to this ER if pain intensifies too much. Pt expressed understanding.      Constance Evans RN  01/05/25 0916

## 2025-01-05 NOTE — ED NOTES
Pt requesting stronger pain medications to take at home. Provider made aware.      Constance Evans RN  01/05/25 0901

## 2025-01-05 NOTE — ED NOTES
Hat, strainers, and sterile cup provided for pt. Pt educated on straining urine and expressed understanding.      Constance Evans RN  01/05/25 6820

## 2025-01-05 NOTE — ED PROVIDER NOTES
Time reflects when diagnosis was documented in both MDM as applicable and the Disposition within this note       Time User Action Codes Description Comment    1/5/2025  8:33 AM Jean Pham [R10.9] Right flank pain     1/5/2025  8:34 AM Jean Pham [R11.0] Nausea     1/5/2025  8:34 AM Jean Pham [N23] Renal colic     1/5/2025  8:34 AM Jean Pham [N13.1] Hydronephrosis due to obstruction of ureter           ED Disposition       ED Disposition   Discharge    Condition   Stable    Date/Time   Sun Jan 5, 2025  9:24 AM    Comment   Fe Morataya discharge to home/self care.                   Assessment & Plan       Medical Decision Making  64-year-old female presents emerged part for right-sided low back pain.  Patient states this is similar to her prior episode of kidney stones from last year.  Patient states last year was left-sided this year his right side.  Patient denies fevers chills abdominal pain shortness of breath or any other complaint.  Patient states the discomfort feels pressure that radiates laterally.  Labs reviewed.  CT abdomen reviewed.  Urinalysis reviewed.  Doubt infection.  Urology returns and states that patient is suitable for follow-up as outpatient.  Per up-to-date alpha 1 inhibitor and nonsteroidal anti-inflammatories are recommended for management at home until follow-up with urology.  Educated patient of diagnosis and home management.  Encourage patient utilize analgesic medications as well as Flomax as directed.  Encouraged patient to call urology next day for follow-up.  Educated on persistent worsening signs symptoms and to return to the emergency department if needed.  Patient mated understanding and agreement was discharged in amatory condition.    Amount and/or Complexity of Data Reviewed  Labs: ordered.  Radiology: ordered. Decision-making details documented in ED Course.    Risk  Prescription drug management.        ED Course as of 01/05/25 1823   Sun Bashir  05, 2025   0815 CT renal stone study abdomen pelvis wo contrast   0851 Left reviewed.  Urinalysis reviewed.  CT results.  Epic chat to on-call urology awaiting response.       Medications   ondansetron (ZOFRAN) injection 4 mg (4 mg Intravenous Given 1/5/25 0749)   HYDROmorphone (DILAUDID) injection 0.5 mg (0.5 mg Intravenous Given 1/5/25 0753)   acetaminophen (Ofirmev) injection 1,000 mg (0 mg Intravenous Stopped 1/5/25 0810)   ketorolac (TORADOL) injection 15 mg (15 mg Intravenous Given 1/5/25 0748)       ED Risk Strat Scores                                              History of Present Illness       Chief Complaint   Patient presents with    Flank Pain     Right sided flank pain that began two nights ago with sharp pain. Nauseous.       Past Medical History:   Diagnosis Date    Breast lump     Cancer (HCC)     ca of upper lip recently      Past Surgical History:   Procedure Laterality Date    BREAST SURGERY Left 2013    [place] CYSTO/URETERO W/LITHOTRIPSY &INDWELL STENT INSRT Left 3/15/2024    Procedure: CYSTOSCOPY URETEROSCOPY, BASKET STONE EXTRACTION AND INSERTION STENT URETERAL;  Surgeon: Geoffrey Henry MD;  Location: [facility] OR;  Service: Urology    LA LAPAROSCOPY SURG CHOLECYSTECTOMY N/A 4/28/2021    Procedure: CHOLECYSTECTOMY LAPAROSCOPIC;  Surgeon: Bear Keating MD;  Location: [facility];  Service: General      Family History   Problem Relation Age of Onset    Cancer Father       Social History     Tobacco Use    Smoking status: Every Day     Current packs/day: 0.50     Average packs/day: 0.5 packs/day for 40.0 years (20.0 ttl pk-yrs)     Types: Cigarettes    Smokeless tobacco: Never   Vaping Use    Vaping status: Never Used   Substance Use Topics    Alcohol use: Yes     Comment: Socially    Drug use: Never      E-Cigarette/Vaping    E-Cigarette Use Never User       E-Cigarette/Vaping Substances      I have reviewed and agree with the history as documented.       Flank Pain  Pain location:  R flank  Pain  quality: aching and pressure    Pain quality comment:  Wrapping around to side.  Pain radiates to:  Does not radiate  Pain severity:  Moderate  Onset quality:  Gradual  Duration:  2 days  Timing:  Constant  Progression:  Worsening  Chronicity:  Recurrent (Successful retrieval approximately 1 year ago with stent placement.)  Context: previous surgery    Context: not recent travel, not retching, not sick contacts, not suspicious food intake and not trauma    Relieved by:  Nothing  Associated symptoms: nausea    Associated symptoms: no chest pain, no chills, no constipation, no cough, no diarrhea, no fatigue, no fever, no flatus, no hematemesis, no hematochezia, no hematuria and no shortness of breath    Risk factors comment:  Known history of kidney stones.      Review of Systems   Constitutional:  Negative for chills, fatigue and fever.   Respiratory:  Negative for cough and shortness of breath.    Cardiovascular:  Negative for chest pain.   Gastrointestinal:  Positive for nausea. Negative for constipation, diarrhea, flatus, hematemesis and hematochezia.   Genitourinary:  Positive for flank pain. Negative for hematuria.   All other systems reviewed and are negative.          Objective       ED Triage Vitals [01/05/25 0734]   Temperature Pulse Blood Pressure Respirations SpO2 Patient Position - Orthostatic VS   98.2 °F (36.8 °C) 69 126/74 20 96 % Lying      Temp Source Heart Rate Source BP Location FiO2 (%) Pain Score    Oral Monitor Left arm -- 10 - Worst Possible Pain      Vitals      Date and Time Temp Pulse SpO2 Resp BP Pain Score FACES Pain Rating User   01/05/25 0930 -- 62 96 % 18 111/68 -- --    01/05/25 0900 -- 59 95 % 18 109/65 -- --    01/05/25 0822 -- -- -- -- -- 3 --    01/05/25 0800 -- 67 98 % 20 116/76 -- --    01/05/25 0753 -- -- -- -- -- 10 - Worst Possible Pain --    01/05/25 0748 -- -- -- -- -- 10 - Worst Possible Pain --    01/05/25 0734 98.2 °F (36.8 °C) 69 96 % 20 126/74 10 - Worst  Possible Pain -- KR            Physical Exam  Constitutional:       General: She is in acute distress.      Appearance: She is not ill-appearing, toxic-appearing or diaphoretic.   HENT:      Head: Atraumatic.      Nose: Nose normal.   Eyes:      Conjunctiva/sclera: Conjunctivae normal.   Cardiovascular:      Rate and Rhythm: Normal rate.   Abdominal:      Tenderness: There is left CVA tenderness.      Comments: No rash appreciated.   Musculoskeletal:         General: No swelling or tenderness. Normal range of motion.   Skin:     General: Skin is warm and dry.      Capillary Refill: Capillary refill takes less than 2 seconds.      Findings: No rash.   Neurological:      General: No focal deficit present.      Mental Status: She is alert and oriented to person, place, and time.   Psychiatric:         Mood and Affect: Mood normal.         Behavior: Behavior normal.         Results Reviewed       Procedure Component Value Units Date/Time    Urine Microscopic [[id number]]  (Abnormal) Collected: 01/05/25 0758    Lab Status: Final result Specimen: Urine, Clean Catch Updated: 01/05/25 0836     RBC, UA 10-20 /hpf      WBC, UA 0-1 /hpf      Epithelial Cells Occasional /hpf      Bacteria, UA Occasional /hpf     UA w Reflex to Microscopic w Reflex to Culture [[id number]]  (Abnormal) Collected: 01/05/25 0758    Lab Status: Final result Specimen: Urine, Clean Catch Updated: 01/05/25 0833     Color, UA Yellow     Clarity, UA Clear     Specific Gravity, UA 1.020     pH, UA 6.5     Leukocytes, UA Trace     Nitrite, UA Negative     Protein, UA Negative mg/dl      Glucose, UA Negative mg/dl      Ketones, UA Negative mg/dl      Urobilinogen, UA <2.0 mg/dl      Bilirubin, UA Negative     Occult Blood, UA Small    Comprehensive metabolic panel [[id number]]  (Abnormal) Collected: 01/05/25 0747    Lab Status: Final result Specimen: Blood from Arm, Right Updated: 01/05/25 0831     Sodium 138 mmol/L      Potassium 3.9 mmol/L      Chloride 107  mmol/L      CO2 30 mmol/L      ANION GAP 1 mmol/L      BUN 22 mg/dL      Creatinine 0.75 mg/dL      Glucose 103 mg/dL      Calcium 9.3 mg/dL      AST 11 U/L      ALT 8 U/L      Alkaline Phosphatase 76 U/L      Total Protein 6.6 g/dL      Albumin 4.2 g/dL      Total Bilirubin 0.63 mg/dL      eGFR 83 ml/min/1.73sq m     Narrative:      National Kid[employer] Disease Foundation guidelines for Chronic Kidney Disease (CKD):     Stage 1 with normal or high GFR (GFR > 90 mL/min/1.73 square meters)    Stage 2 Mild CKD (GFR = 60-89 mL/min/1.73 square meters)    Stage 3A Moderate CKD (GFR = 45-59 mL/min/1.73 square meters)    Stage 3B Moderate CKD (GFR = 30-44 mL/min/1.73 square meters)    Stage 4 Severe CKD (GFR = 15-29 mL/min/1.73 square meters)    Stage 5 End Stage CKD (GFR <15 mL/min/1.73 square meters)  Note: GFR calculation is accurate only with a steady state creatinine    CBC and differential [[id number]]  (Abnormal) Collected: 01/05/25 0747    Lab Status: Final result Specimen: Blood from Arm, Right Updated: 01/05/25 0804     WBC 14.50 Thousand/uL      RBC 4.71 Million/uL      Hemoglobin 14.4 g/dL      Hematocrit 44.2 %      MCV 94 fL      MCH 30.6 pg      MCHC 32.6 g/dL      RDW 13.2 %      MPV 8.5 fL      Platelets 293 Thousands/uL      nRBC 0 /100 WBCs      Segmented % 73 %      Immature Grans % 0 %      Lymphocytes % 20 %      Monocytes % 6 %      Eosinophils Relative 1 %      Basophils Relative 0 %      Absolute Neutrophils 10.52 Thousands/µL      Absolute Immature Grans 0.04 Thousand/uL      Absolute Lymphocytes 2.84 Thousands/µL      Absolute Monocytes 0.91 Thousand/µL      Eosinophils Absolute 0.16 Thousand/µL      Basophils Absolute 0.03 Thousands/µL             CT renal stone study abdomen pelvis wo contrast   Final Interpretation by Rupesh Alvarez MD (01/05 0851)   Addendum (preliminary) 1 of 1 by Rupesh Alvarez MD (01/05 0851)   ADDENDUM:      Correction to the impression:      3 mm  right distal ureteral calculus with mild upstream    hydronephroureterosis. No perinephric collection.      Final      2 mm right distal ureteral calculus with mild upstream hydronephroureterosis. No perinephric collection.      Bilateral renal calculi, 2 mm and smaller.      The study was marked in EPIC for immediate notification.         Workstation performed: [id number]             Procedures    ED Medication and Procedure Management   Prior to Admission Medications   Prescriptions Last Dose Informant Patient Reported? Taking?   Ascorbic Acid (vitamin C) 1000 MG tablet 1/4/2025 at  7:00 AM Self Yes Yes   Sig: Take 1,000 mg by mouth daily   Lactobacillus TABS 1/4/2025 at  7:00 AM  Yes Yes   Sig: Take 1 tablet by mouth every morning   cholecalciferol (VITAMIN D3) 400 units tablet 1/4/2025 at  7:00 AM Self Yes Yes   Sig: Take 400 Units by mouth daily   omeprazole (PriLOSEC) 20 mg delayed release capsule Past Month Self Yes Yes   Sig: Take 20 mg by mouth daily as needed      Facility-Administered Medications: None     Discharge Medication List as of 1/5/2025  9:30 AM        START taking these medications    Details   ondansetron (ZOFRAN-ODT) 4 mg disintegrating tablet Take 1 tablet (4 mg total) by mouth every 6 (six) hours as needed for nausea or vomiting, Starting Sun 1/5/2025, Normal      tamsulosin (FLOMAX) 0.4 mg Take 1 capsule (0.4 mg total) by mouth daily with dinner for 10 days, Starting Sun 1/5/2025, Until Wed 1/15/2025, Normal           CONTINUE these medications which have NOT CHANGED    Details   Ascorbic Acid (vitamin C) 1000 MG tablet Take 1,000 mg by mouth daily, Historical Med      cholecalciferol (VITAMIN D3) 400 units tablet Take 400 Units by mouth daily, Historical Med      Lactobacillus TABS Take 1 tablet by mouth every morning, Historical Med      omeprazole (PriLOSEC) 20 mg delayed release capsule Take 20 mg by mouth daily as needed, Historical Med           No discharge procedures on file.  ED  SEPSIS DOCUMENTATION   Time reflects when diagnosis was documented in both MDM as applicable and the Disposition within this note       Time User Action Codes Description Comment    1/5/2025  8:33 AM Jean Pham [R10.9] Right flank pain     1/5/2025  8:34 AM Jean Pham [R11.0] Nausea     1/5/2025  8:34 AM Jean Pham [N23] Renal colic     1/5/2025  8:34 AM Jean Pham [N13.1] Hydronephrosis due to obstruction of ureter                  Jean Pham, TATY  01/05/25 0183

## 2025-01-05 NOTE — DISCHARGE INSTRUCTIONS
Take ibuprofen 400 mg along with Tylenol 500 mg every 6 hours.  Please call your known urologist for follow-up.  Phone number supplied on chart.

## 2025-01-05 NOTE — Clinical Note
Fe Morataya was seen and treated in our emergency department on 1/5/2025.    No restrictions            Diagnosis:     Fe  .    She may return on this date: 01/08/2025         If you have any questions or concerns, please don't hesitate to call.      Jean Pham PA-C    ______________________________           _______________          _______________  Hospital Representative                              Date                                Time

## 2025-04-14 ENCOUNTER — HOSPITAL ENCOUNTER (OUTPATIENT)
Dept: RADIOLOGY | Facility: HOSPITAL | Age: 65
Discharge: HOME | End: 2025-04-14
Payer: MEDICARE

## 2025-04-14 DIAGNOSIS — N20.0 URIC ACID NEPHROLITHIASIS: ICD-10-CM

## 2025-04-14 PROCEDURE — 74018 RADEX ABDOMEN 1 VIEW: CPT

## 2025-05-13 ENCOUNTER — HOSPITAL ENCOUNTER (OUTPATIENT)
Dept: RADIOLOGY | Facility: HOSPITAL | Age: 65
Discharge: HOME | End: 2025-05-13
Attending: SPECIALIST
Payer: MEDICARE

## 2025-05-13 DIAGNOSIS — N20.0 CALCULUS OF KIDNEY: ICD-10-CM

## 2025-05-13 PROCEDURE — 76775 US EXAM ABDO BACK WALL LIM: CPT

## 2025-06-18 ENCOUNTER — OFFICE VISIT (OUTPATIENT)
Dept: URGENT CARE | Facility: CLINIC | Age: 65
End: 2025-06-18
Payer: MEDICARE

## 2025-06-18 VITALS
TEMPERATURE: 98.6 F | RESPIRATION RATE: 14 BRPM | SYSTOLIC BLOOD PRESSURE: 108 MMHG | WEIGHT: 126 LBS | BODY MASS INDEX: 19.16 KG/M2 | HEART RATE: 84 BPM | DIASTOLIC BLOOD PRESSURE: 74 MMHG | OXYGEN SATURATION: 98 %

## 2025-06-18 DIAGNOSIS — J40 BRONCHITIS: Primary | ICD-10-CM

## 2025-06-18 PROCEDURE — 99203 OFFICE O/P NEW LOW 30 MIN: CPT | Performed by: PHYSICIAN ASSISTANT

## 2025-06-18 RX ORDER — PREDNISONE 20 MG/1
20 TABLET ORAL DAILY
Qty: 4 TABLET | Refills: 0 | Status: SHIPPED | OUTPATIENT
Start: 2025-06-18 | End: 2025-06-22

## 2025-06-18 RX ORDER — DEXTROMETHORPHAN HYDROBROMIDE AND PROMETHAZINE HYDROCHLORIDE 15; 6.25 MG/5ML; MG/5ML
5 SYRUP ORAL
Qty: 118 ML | Refills: 0 | Status: SHIPPED | OUTPATIENT
Start: 2025-06-18

## 2025-06-18 NOTE — PROGRESS NOTES
[facility]        NAME: Fe Morataya is a 64 y.o. female  : 1960    MRN: [id number]  DATE: 2025  TIME: 8:44 AM    Assessment and Plan   Bronchitis [J40]  1. Bronchitis  predniSONE 20 mg tablet    promethazine-dextromethorphan (PHENERGAN-DM) 6.25-15 mg/5 mL oral syrup        No NSAIDs while on prednisone. Discussed possible s/es of promethazine DM including drowsiness and advised her not to take it with anything else that causes drowsiness including benadryl or etoh. She verbalized understanding and is in agreement with plan.     Discussed strict return to care precautions as well as red flag symptoms which should prompt immediate ED referral. Pt verbalized understanding and is in agreement with plan.  Please follow up with your primary care provider within the next week. Please remember that your visit today was with an urgent care provider and should not replace follow up with your primary care provider for chronic medical issues or annual physicals.       Patient Instructions       Follow up with PCP in 3-5 days.  Proceed to  ER if symptoms worsen.    If tests are performed, our office will contact you with results only if changes need to made to the care plan discussed with you at the visit. You can review your full results on [facility].    Chief Complaint     Chief Complaint   Patient presents with    Cough     Pt presents with a cough ongoing 1-2 weeks// OTC meds helping         History of Present Illness       Patient is a 64-year-old female with no reported PMH presenting with cough x 1.5 weeks.  No congestion, body aches, sore throat, chest pain, shortness of breath.  Reports some wheezing.  Cough has been productive since she started taking Mucinex.  States she overall feels well, just coughing a lot especially at night.        Review of Systems   Review of Systems   Constitutional:  Negative for chills, diaphoresis and fever.   HENT:  Negative for congestion, ear pain,  postnasal drip, rhinorrhea, sinus pain, sneezing, sore throat and trouble swallowing.    Eyes:  Negative for pain and redness.   Respiratory:  Positive for cough and wheezing. Negative for chest tightness and shortness of breath.    Cardiovascular:  Negative for chest pain and leg swelling.   Gastrointestinal:  Negative for diarrhea, nausea and vomiting.   Musculoskeletal:  Negative for myalgias.   Neurological:  Negative for dizziness, weakness and headaches.         Current Medications     Current Medications[1]    Current Allergies     Allergies as of 06/18/2025 - Reviewed 06/18/2025   Allergen Reaction Noted    Tramadol GI Intolerance 07/29/2021            The following portions of the patient's history were reviewed and updated as appropriate: allergies, current medications, past family history, past medical history, past social history, past surgical history and problem list.     Past Medical History[2]    Past Surgical History[3]    Family History[4]      Medications have been verified.        Objective   /74   Pulse 84   Temp 98.6 °F (37 °C) (Tympanic)   Resp 14   Wt 57.2 kg (126 lb)   SpO2 98%   BMI 19.16 kg/m²        Physical Exam     Physical Exam  Vitals and nursing note reviewed.   Constitutional:       General: She is not in acute distress.     Appearance: Normal appearance. She is not toxic-appearing.   HENT:      Head: Normocephalic and atraumatic.      Nose: No congestion.      Mouth/Throat:      Mouth: Mucous membranes are moist.      Pharynx: Oropharynx is clear. No oropharyngeal exudate or posterior oropharyngeal erythema.     Eyes:      Conjunctiva/sclera: Conjunctivae normal.      Pupils: Pupils are equal, round, and reactive to light.       Cardiovascular:      Rate and Rhythm: Normal rate and regular rhythm.      Heart sounds: Normal heart sounds.   Pulmonary:      Effort: Pulmonary effort is normal. No respiratory distress.      Breath sounds: Wheezing (improves with cough)  present. No rhonchi or rales.     Musculoskeletal:      Cervical back: Normal range of motion and neck supple.   Lymphadenopathy:      Cervical: No cervical adenopathy.     Skin:     General: Skin is warm and dry.      Capillary Refill: Capillary refill takes less than 2 seconds.     Neurological:      Mental Status: She is alert and oriented to person, place, and time.     Psychiatric:         Behavior: Behavior normal.                        [1]   Current Outpatient Medications:     Ascorbic Acid (vitamin C) 1000 MG tablet, Take 1,000 mg by mouth in the morning., Disp: , Rfl:     cholecalciferol (VITAMIN D3) 400 units tablet, Take 400 Units by mouth in the morning., Disp: , Rfl:     Lactobacillus TABS, Take 1 tablet by mouth every morning, Disp: , Rfl:     omeprazole (PriLOSEC) 20 mg delayed release capsule, Take 20 mg by mouth daily as needed, Disp: , Rfl:     predniSONE 20 mg tablet, Take 1 tablet (20 mg total) by mouth daily for 4 days, Disp: 4 tablet, Rfl: 0    promethazine-dextromethorphan (PHENERGAN-DM) 6.25-15 mg/5 mL oral syrup, Take 5 mL by mouth daily at bedtime as needed for cough, Disp: 118 mL, Rfl: 0    ondansetron (ZOFRAN-ODT) 4 mg disintegrating tablet, Take 1 tablet (4 mg total) by mouth every 6 (six) hours as needed for nausea or vomiting, Disp: 6 tablet, Rfl: 0    tamsulosin (FLOMAX) 0.4 mg, Take 1 capsule (0.4 mg total) by mouth daily with dinner for 10 days, Disp: 10 capsule, Rfl: 0  [2]   Past Medical History:  Diagnosis Date    Breast lump     Cancer (HCC)     ca of upper lip recently    GERD (gastroesophageal reflux disease)    [3]   Past Surgical History:  Procedure Laterality Date    BREAST SURGERY Left 2013    [place] CYSTO/URETERO W/LITHOTRIPSY &INDWELL STENT INSRT Left 3/15/2024    Procedure: CYSTOSCOPY URETEROSCOPY, BASKET STONE EXTRACTION AND INSERTION STENT URETERAL;  Surgeon: Geoffrey Henry MD;  Location: [facility];  Service: Urology    [place] LAPAROSCOPY SURG CHOLECYSTECTOMY N/A 4/28/2021     Procedure: CHOLECYSTECTOMY LAPAROSCOPIC;  Surgeon: Bear Keating MD;  Location: [facility];  Service: General   [4]   Family History  Problem Relation Name Age of Onset    Cancer Father

## 2025-06-22 ENCOUNTER — OFFICE VISIT (OUTPATIENT)
Dept: URGENT CARE | Facility: CLINIC | Age: 65
End: 2025-06-22
Payer: MEDICARE

## 2025-06-22 ENCOUNTER — APPOINTMENT (OUTPATIENT)
Dept: RADIOLOGY | Facility: CLINIC | Age: 65
End: 2025-06-22
Attending: PHYSICIAN ASSISTANT
Payer: MEDICARE

## 2025-06-22 VITALS
TEMPERATURE: 97.9 F | OXYGEN SATURATION: 96 % | SYSTOLIC BLOOD PRESSURE: 126 MMHG | BODY MASS INDEX: 19.16 KG/M2 | DIASTOLIC BLOOD PRESSURE: 78 MMHG | HEART RATE: 73 BPM | WEIGHT: 126 LBS | RESPIRATION RATE: 14 BRPM

## 2025-06-22 DIAGNOSIS — R05.8 OTHER COUGH: ICD-10-CM

## 2025-06-22 DIAGNOSIS — R05.8 OTHER COUGH: Primary | ICD-10-CM

## 2025-06-22 PROCEDURE — 99213 OFFICE O/P EST LOW 20 MIN: CPT | Performed by: PHYSICIAN ASSISTANT

## 2025-06-22 PROCEDURE — 71046 X-RAY EXAM CHEST 2 VIEWS: CPT

## 2025-06-22 NOTE — PROGRESS NOTES
[facility]        NAME: Fe Morataya is a 64 y.o. female  : 1960    MRN: [id number]  DATE: 2025  TIME: 10:58 AM    Assessment and Plan   Other cough [R05.8]  1. Other cough  XR chest pa and lateral            Patient Instructions   Persistent cough  Chest x-ray negative vitals normal  Possibly from seasonal allergies  Recommend daily Claritin or Zyrtec along with Delsym  Offered Tessalon Perles patient declined  If no improvement follow-up with PCP    Follow up with PCP in 3-5 days.  Proceed to  ER if symptoms worsen.    If tests have been performed at [facility], our office will contact you with results if changes need to be made to the care plan discussed with you at the visit.  [facility]    Chief Complaint     Chief Complaint   Patient presents with    Cough     Pt presents with persistent cough ongoing since last OV         History of Present Illness       Fe is a 64-year-old female who presents to the clinic complaining of persistent cough x 3 weeks total.  She was here on Wednesday and diagnosed with bronchitis and given prednisone and a cough medicine she states that neither relieve the cough.  She is also continuing her Mucinex.  She states the cough is occasionally productive with a yellow sputum.  She denies any fever, chills, nasal congestion, rhinorrhea, sore throat, body aches, shortness of breath, nausea, vomiting, diarrhea, loss of taste or smell, recent travel, or any known sick contacts.        Review of Systems   Review of Systems   Constitutional:  Negative for chills and fever.   HENT:  Negative for congestion, ear pain (notes crackling in right ear), rhinorrhea and sore throat.    Respiratory:  Positive for cough. Negative for chest tightness and shortness of breath.    Gastrointestinal:  Negative for diarrhea, nausea and vomiting.   Musculoskeletal:  Negative for myalgias.   Neurological:  Negative for headaches.          Current Medications     Current Medications[1]    Current Allergies     Allergies as of 06/22/2025 - Reviewed 06/22/2025   Allergen Reaction Noted    Tramadol GI Intolerance 07/29/2021            The following portions of the patient's history were reviewed and updated as appropriate: allergies, current medications, past family history, past medical history, past social history, past surgical history and problem list.     Past Medical History[2]    Past Surgical History[3]    Family History[4]      Medications have been verified.        Objective   /78   Pulse 73   Temp 97.9 °F (36.6 °C)   Resp 14   Wt 57.2 kg (126 lb)   SpO2 96%   BMI 19.16 kg/m²   No LMP recorded. Patient is postmenopausal.       Physical Exam     Physical Exam  Vitals and nursing note reviewed.   Constitutional:       General: She is not in acute distress.     Appearance: Normal appearance. She is not ill-appearing.   HENT:      Right Ear: Tympanic membrane, ear canal and external ear normal.      Left Ear: Tympanic membrane, ear canal and external ear normal.      Nose: Nose normal.      Mouth/Throat:      Mouth: Mucous membranes are moist.      Pharynx: No oropharyngeal exudate or posterior oropharyngeal erythema.     Cardiovascular:      Rate and Rhythm: Normal rate and regular rhythm.      Heart sounds: Normal heart sounds.   Pulmonary:      Effort: Pulmonary effort is normal. No respiratory distress.      Breath sounds: Normal breath sounds. No stridor. No wheezing, rhonchi or rales.   Lymphadenopathy:      Cervical: No cervical adenopathy.     Neurological:      Mental Status: She is alert and oriented to person, place, and time.     Psychiatric:         Mood and Affect: Mood normal.         Behavior: Behavior normal.                        [1]   Current Outpatient Medications:     Ascorbic Acid (vitamin C) 1000 MG tablet, Take 1,000 mg by mouth in the morning., Disp: , Rfl:     cholecalciferol (VITAMIN D3) 400 units  tablet, Take 400 Units by mouth in the morning., Disp: , Rfl:     Lactobacillus TABS, Take 1 tablet by mouth every morning, Disp: , Rfl:     omeprazole (PriLOSEC) 20 mg delayed release capsule, Take 20 mg by mouth daily as needed, Disp: , Rfl:     predniSONE 20 mg tablet, Take 1 tablet (20 mg total) by mouth daily for 4 days, Disp: 4 tablet, Rfl: 0    promethazine-dextromethorphan (PHENERGAN-DM) 6.25-15 mg/5 mL oral syrup, Take 5 mL by mouth daily at bedtime as needed for cough, Disp: 118 mL, Rfl: 0    ondansetron (ZOFRAN-ODT) 4 mg disintegrating tablet, Take 1 tablet (4 mg total) by mouth every 6 (six) hours as needed for nausea or vomiting, Disp: 6 tablet, Rfl: 0    tamsulosin (FLOMAX) 0.4 mg, Take 1 capsule (0.4 mg total) by mouth daily with dinner for 10 days, Disp: 10 capsule, Rfl: 0  [2]   Past Medical History:  Diagnosis Date    Breast lump     Cancer (HCC)     ca of upper lip recently    GERD (gastroesophageal reflux disease)    [3]   Past Surgical History:  Procedure Laterality Date    BREAST SURGERY Left 2013    [place] CYSTO/URETERO W/LITHOTRIPSY &INDWELL STENT INSRT Left 3/15/2024    Procedure: CYSTOSCOPY URETEROSCOPY, BASKET STONE EXTRACTION AND INSERTION STENT URETERAL;  Surgeon: eGoffrey Henry MD;  Location: [facility];  Service: Urology    TN LAPAROSCOPY SURG CHOLECYSTECTOMY N/A 4/28/2021    Procedure: CHOLECYSTECTOMY LAPAROSCOPIC;  Surgeon: Bear Keating MD;  Location: [facility];  Service: General   [4]   Family History  Problem Relation Name Age of Onset    Cancer Father

## (undated) DEVICE — SEAL BIOPSY PORT ADJUST F/ACCESSORIES UP TO 3FR

## (undated) DEVICE — LUBRICANT JELLY SURGILUBE TUBE 4OZ FLIP TOP

## (undated) DEVICE — SPONGE 4 X 4 XRAY 16 PLY STRL LF RFD

## (undated) DEVICE — SMOKE REMOVAL SYSTEM: Brand: BOEHRINGER® SMOKE REMOVAL SYSTEM

## (undated) DEVICE — TROCAR: Brand: KII FIOS FIRST ENTRY

## (undated) DEVICE — GLOVE SRG BIOGEL 8

## (undated) DEVICE — ELECTRODE LAP L WIRE E-Z CLEAN 33CM -0100

## (undated) DEVICE — LIGAMAX 5 MM ENDOSCOPIC MULTIPLE CLIP APPLIER: Brand: LIGAMAX

## (undated) DEVICE — SYRINGE 10ML LL

## (undated) DEVICE — DRAPE UTILITY

## (undated) DEVICE — ASTOUND STANDARD SURGICAL GOWN, XL: Brand: CONVERTORS

## (undated) DEVICE — FABRIC REINFORCED, SURGICAL GOWN, XL: Brand: CONVERTORS

## (undated) DEVICE — BASIC DOUBLE BASIN 2-LF: Brand: MEDLINE INDUSTRIES, INC.

## (undated) DEVICE — IRRIG ENDO FLO TUBING

## (undated) DEVICE — ADHESIVE SKIN HIGH VISCOSITY EXOFIN 1ML

## (undated) DEVICE — TUBING SMOKE EVAC W/FILTRATION DEVICE PLUMEPORT ACTIV

## (undated) DEVICE — ENDOPATH PNEUMONEEDLE INSUFFLATION NEEDLES WITH LUER LOCK CONNECTORS 120MM: Brand: ENDOPATH

## (undated) DEVICE — SOLUTION BOWL: Brand: KENDALL

## (undated) DEVICE — CYSTOSCOPY PACK: Brand: CONVERTORS

## (undated) DEVICE — PACK GENERAL LF

## (undated) DEVICE — SCD SEQUENTIAL COMPRESSION COMFORT SLEEVE MEDIUM KNEE LENGTH: Brand: KENDALL SCD

## (undated) DEVICE — TIBURON GENERAL ENDOSCOPY DRAPE: Brand: CONVERTORS

## (undated) DEVICE — SUT VICRYL 0 UR-6 27 IN J603H

## (undated) DEVICE — CYSTO TUBING TUR Y IRRIGATION

## (undated) DEVICE — LATEX FREE 10 FT  INSUFFLATION TUBING, COLDER CONNECTOR WITH 1 MICRON FILTER STERILE ONE TIME USE, 20 U: Brand: SURGICAL DIRECT

## (undated) DEVICE — ANTI-FOG SOLUTION WITH FOAM PAD: Brand: DEVON

## (undated) DEVICE — POUCH URO CATCHER II STERILE

## (undated) DEVICE — CHLORAPREP HI-LITE 26ML ORANGE

## (undated) DEVICE — TOWEL SET X-RAY

## (undated) DEVICE — NGAGE, NITINOL STONE EXTRACTOR MODIFIED BASKET: Brand: NGAGE

## (undated) DEVICE — GLOVE INDICATOR PI UNDERGLOVE SZ 7.5 BLUE

## (undated) DEVICE — SHEARS MONOPOL MINI 5MM X 31CM RATCHET HNDL

## (undated) DEVICE — TISSUE RETRIEVAL SYSTEM: Brand: INZII RETRIEVAL SYSTEM

## (undated) DEVICE — INTENDED FOR TISSUE SEPARATION, AND OTHER PROCEDURES THAT REQUIRE A SHARP SURGICAL BLADE TO PUNCTURE OR CUT.: Brand: BARD-PARKER SAFETY BLADES SIZE 11, STERILE

## (undated) DEVICE — TROCAR: Brand: KII® SLEEVE

## (undated) DEVICE — Device

## (undated) DEVICE — SPONGE STICK WITH PVP-I: Brand: KENDALL

## (undated) DEVICE — SUT MONOCRYL 4-0 PS-2 18 IN Y496G